# Patient Record
Sex: FEMALE | Race: WHITE | NOT HISPANIC OR LATINO | Employment: UNEMPLOYED | URBAN - METROPOLITAN AREA
[De-identification: names, ages, dates, MRNs, and addresses within clinical notes are randomized per-mention and may not be internally consistent; named-entity substitution may affect disease eponyms.]

---

## 2021-05-26 ENCOUNTER — RECORDS - HEALTHEAST (OUTPATIENT)
Dept: ADMINISTRATIVE | Facility: CLINIC | Age: 61
End: 2021-05-26

## 2023-03-23 ENCOUNTER — HOSPITAL ENCOUNTER (EMERGENCY)
Facility: HOSPITAL | Age: 63
Discharge: HOME | End: 2023-03-24
Attending: EMERGENCY MEDICINE | Admitting: EMERGENCY MEDICINE
Payer: COMMERCIAL

## 2023-03-23 DIAGNOSIS — R20.0 NUMBNESS OF RIGHT FOOT: ICD-10-CM

## 2023-03-23 PROCEDURE — 99282 EMERGENCY DEPT VISIT SF MDM: CPT

## 2023-03-23 ASSESSMENT — ACTIVITIES OF DAILY LIVING (ADL): ADLS_ACUITY_SCORE: 35

## 2023-03-23 ASSESSMENT — ENCOUNTER SYMPTOMS
NUMBNESS: 1
BACK PAIN: 1

## 2023-03-24 VITALS
TEMPERATURE: 97.3 F | SYSTOLIC BLOOD PRESSURE: 131 MMHG | RESPIRATION RATE: 20 BRPM | OXYGEN SATURATION: 92 % | HEART RATE: 78 BPM | DIASTOLIC BLOOD PRESSURE: 80 MMHG | WEIGHT: 185 LBS | BODY MASS INDEX: 29.03 KG/M2 | HEIGHT: 67 IN

## 2023-03-24 NOTE — DISCHARGE INSTRUCTIONS
Please contact the spine clinic tomorrow to discuss her symptoms and to arrange a follow-up appointment.    Return back to ED sooner for any worsening numbness/tingling/weakness in your right foot or leg, loss of bowel bladder control, worsening back or leg pain, or any other new or concerning symptoms.

## 2023-03-24 NOTE — ED PROVIDER NOTES
EMERGENCY DEPARTMENT ENCOUNTER      NAME: Michell Orozco  AGE: 62 year old female  YOB: 1960  MRN: [id number]  EVALUATION DATE & TIME: 3/23/2023 10:14 PM    PCP: No primary care provider on file.    ED PROVIDER: Rohith Rodriguez DO      Chief Complaint   Patient presents with     Numbness         FINAL IMPRESSION:  1. Numbness of right foot          ED COURSE & MEDICAL DECISION MAKIN-year-old female with a history of low back pain and right leg sciatica who recently received a steroid injection to the lumbar region a few days ago presented to the ED for evaluation of sudden onset right foot numbness.  The patient states that she has experienced right leg and foot paresthesias in the past with her history of sciatica.  However, she states that she has never experienced a complete numbness in her foot.  She denies any associated weakness within the right foot or leg.  She also denied any loss of bowel or bladder control or saddle anesthesia.  The patient also denied any numbness/tingling/weakness in her remaining face or extremities.  She also denies any associated headaches.  The patient also noted that her numbness was resolving when she arrived to the ED.  On exam the patient was noted to have mild subjective sensory discrepancies to light touch involving only the toes and her right foot when compared to the left.  No subjective deficits were noted with the remainder of the right foot when compared to the left.  Strength in the bilateral lower extremities was 5/5.     The patient was informed that the right foot numbness is likely somehow related to her right-sided sciatica.  She was informed that the numbness was not likely due to a CVA.  Discussion was had with the patient  about the possibility of imaging both the head and/or lumbar spine.  However, both the patient and  felt comfortable returning home without any additional imaging because of her numbness had essentially resolved.   The patient was then instructed to contact her spine clinic tomorrow for reevaluation.  She was instructed to return back to ED sooner for any worsening numbness/tingling/weakness in the right leg or foot, remaining extremities, face, or for any loss of bowel or bladder control, headaches, or any other new or concerning symptoms.    Pertinent Labs & Imaging studies reviewed. (See chart for details)  10:14 PM I met with the patient to gather history and to perform my initial exam. We discussed plans for the ED course, including diagnostic testing and treatment.   12:26 AM We discussed plans for discharge.       At the conclusion of the encounter I discussed the results of all of the tests and the disposition. The questions were answered. The patient or family acknowledged understanding and was agreeable with the care plan.        PPE worn: n95 mask, goggles    Medical Decision Making    History:    Supplemental history from: Documented in chart, if applicable    External Record(s) reviewed: Documented in chart, if applicable.    Work Up:    Chart documentation includes differential considered and any EKGs or imaging independently interpreted by provider.    In additional to work up documented, I considered the following work up: Documented in chart, if applicable.    External consultation:    Discussion of management with another provider: Documented in chart, if applicable    Complicating factors:    Care impacted by chronic illness: Hypertension    Care affected by social determinants of health: N/A    Disposition considerations: Discharge. No recommendations on prescription strength medication(s). N/A.       MEDICATIONS GIVEN IN THE EMERGENCY:  Medications - No data to display    NEW PRESCRIPTIONS STARTED AT TODAY'S ER VISIT  There are no discharge medications for this patient.         =================================================================    HPI    Patient information was obtained from: Patient     Use  "of : N/A         Michell Orozco is a 62 year old female with a pertinent history of GERD, HTN, slom camcer, who presents to this ED via walk in accompanied by  for evaluation of numbness.     About 1 month ago, the patient bended over, got up went home and started to have back pain. She notes the back pain became severely painful therefore she went to the chiropractor to readjust her back with no relief. The patient got a MRI which resulted with right impingement. The patient got steroid injection to her back on Monday 03/20 (~4 days ago).  Since the back pain started she endorses right lower extremity tingling. She describe the tingling as \"pins and needles\" sensation. Tonight, the patient felt numbness to right lower extremity that is a new sensation. She could still move her toes but does not feel the sensation of moving them. She states having right leg numbness since 8 PM (~2 hours). Denies any other complaints or concerns at the moment.       REVIEW OF SYSTEMS   Review of Systems   Musculoskeletal: Positive for back pain.   Neurological: Positive for numbness (right lower extremities).   All other systems reviewed and are negative.       PAST MEDICAL HISTORY:  History reviewed. No pertinent past medical history.    PAST SURGICAL HISTORY:  History reviewed. No pertinent surgical history.        CURRENT MEDICATIONS:    No current outpatient medications on file.      ALLERGIES:  No Known Allergies    FAMILY HISTORY:  No family history on file.    SOCIAL HISTORY:   Social History     Socioeconomic History     Marital status: Single     Spouse name: None     Number of children: None     Years of education: None     Highest education level: None       VITALS:  /80   Pulse 78   Temp 97.3  F (36.3  C) (Oral)   Resp 20   Ht 1.702 m (5' 7\")   Wt 83.9 kg (185 lb)   SpO2 92%   BMI 28.98 kg/m      PHYSICAL EXAM    General presentation: Alert, Vital signs reviewed. NAD  HENT: ENT inspection is " normal. Oropharynx is moist and clear.   Eye: Pupils are equal and reactive to light. EOMI  Neck: The neck is supple, with full ROM, with no evidence of meningismus.  Pulmonary: Currently in no acute respiratory distress. Normal, non labored respirations, the lung sounds are normal with good equal air movement. Clear to auscultation bilaterally.   Circulatory: Regular rate and rhythm. Peripheral pulses are strong and equal. No murmurs, rubs, or gallops.   Abdominal: The abdomen is soft. Nontender. No rigidity, guarding, or rebound. Bowel sounds normal.   Neurologic: Alert, oriented to person, place, and time. Strength is 5/5 with hip flexion, knee flexion and extension, plantarflexion/dorsiflexion, and first toe flexion/extension.  Mild sensory discrepancies were noted to palpation involving the toes of the right foot when compared to the left.  No other sensory deficits were noted within the right leg or remaining extremities.  Cranial nerves II through XII are intact.  Musculoskeletal: No extremity tenderness. Full range of motion in all extremities. No extremity edema.   Skin: Skin color is normal. No rash. Warm. Dry to touch.       ITracy , am serving as a scribe to document services personally performed by Rohith Rodriguez DO based on my observation and the provider's statements to me. I, Rohith Rodriguez, attest that Tracy Mccabe is acting in a scribe capacity, has observed my performance of the services and has documented them in accordance with my direction.    Rohith Rodriguez DO  Emergency Medicine  [facility] EMERGENCY DEPARTMENT  [address] [place] [id number]  [phone]        Rohith Rodriguez DO  03/24/23 0154

## 2023-03-24 NOTE — ED TRIAGE NOTES
Patient arrives to triage from home with chief complaint of right foot numbness which started around 2000 tonight.  Patient reports having a steroid injection on 03/20/23 at L4-L5.  Now since arriving to triage patient reports sensation is starting to return.  She can feel the carpet on right foot in triage.  Pulses are present.  Non-NIH is negative for writer.  Patient reports she was having difficulty before arrival placing weight on the right foot.  Alert and oriented x4.

## 2023-03-24 NOTE — ED NOTES
"Pt reports that when she arrived her foot was tingling \"like it had fallen asleep, but really really extreme\" \"scared me\". Says that at home she couldn't feel anything at all. She says that it has improved a little since she has been here in the ER, but still not normal. She is able to feel textures and sensations.     Received steroid injection in lower back on Monday.  "

## 2023-03-27 ENCOUNTER — APPOINTMENT (OUTPATIENT)
Dept: ULTRASOUND IMAGING | Facility: HOSPITAL | Age: 63
End: 2023-03-27
Attending: EMERGENCY MEDICINE
Payer: COMMERCIAL

## 2023-03-27 ENCOUNTER — APPOINTMENT (OUTPATIENT)
Dept: INTERVENTIONAL RADIOLOGY/VASCULAR | Facility: HOSPITAL | Age: 63
End: 2023-03-27
Attending: RADIOLOGY
Payer: COMMERCIAL

## 2023-03-27 ENCOUNTER — HOSPITAL ENCOUNTER (INPATIENT)
Facility: HOSPITAL | Age: 63
LOS: 4 days | Discharge: HOME | End: 2023-03-31
Attending: EMERGENCY MEDICINE | Admitting: INTERNAL MEDICINE
Payer: COMMERCIAL

## 2023-03-27 DIAGNOSIS — I82.4Y1: ICD-10-CM

## 2023-03-27 DIAGNOSIS — I26.99 SUBACUTE MASSIVE PULMONARY EMBOLISM (H): ICD-10-CM

## 2023-03-27 DIAGNOSIS — I48.92 PAROXYSMAL ATRIAL FLUTTER (H): Primary | ICD-10-CM

## 2023-03-27 DIAGNOSIS — R00.0 SINUS TACHYCARDIA: ICD-10-CM

## 2023-03-27 DIAGNOSIS — R06.02 SOB (SHORTNESS OF BREATH): ICD-10-CM

## 2023-03-27 DIAGNOSIS — R79.89 ELEVATED TROPONIN: ICD-10-CM

## 2023-03-27 DIAGNOSIS — R79.89 ELEVATED BRAIN NATRIURETIC PEPTIDE (BNP) LEVEL: ICD-10-CM

## 2023-03-27 PROBLEM — G43.009 MIGRAINE WITHOUT AURA AND WITHOUT STATUS MIGRAINOSUS, NOT INTRACTABLE: Status: ACTIVE | Noted: 2019-04-26

## 2023-03-27 LAB
ACT BLD: 186 SECONDS (ref 74–150)
ACT BLD: 237 SECONDS (ref 74–150)
APTT PPP: 185 SECONDS (ref 22–38)
ATRIAL RATE - MUSE: 120 BPM
BASOPHILS # BLD AUTO: 0 10E3/UL (ref 0–0.2)
BASOPHILS NFR BLD AUTO: 0 %
DIASTOLIC BLOOD PRESSURE - MUSE: 81 MMHG
EOSINOPHIL # BLD AUTO: 0.1 10E3/UL (ref 0–0.7)
EOSINOPHIL NFR BLD AUTO: 1 %
ERYTHROCYTE [DISTWIDTH] IN BLOOD BY AUTOMATED COUNT: 13.1 % (ref 10–15)
HCT VFR BLD AUTO: 41.2 % (ref 35–47)
HGB BLD-MCNC: 13.9 G/DL (ref 11.7–15.7)
IMM GRANULOCYTES # BLD: 0.1 10E3/UL
IMM GRANULOCYTES NFR BLD: 1 %
INR PPP: 1.42 (ref 0.85–1.15)
INTERPRETATION ECG - MUSE: NORMAL
LYMPHOCYTES # BLD AUTO: 2.3 10E3/UL (ref 0.8–5.3)
LYMPHOCYTES NFR BLD AUTO: 23 %
MCH RBC QN AUTO: 29 PG (ref 26.5–33)
MCHC RBC AUTO-ENTMCNC: 33.7 G/DL (ref 31.5–36.5)
MCV RBC AUTO: 86 FL (ref 78–100)
MONOCYTES # BLD AUTO: 1.2 10E3/UL (ref 0–1.3)
MONOCYTES NFR BLD AUTO: 12 %
NEUTROPHILS # BLD AUTO: 6.7 10E3/UL (ref 1.6–8.3)
NEUTROPHILS NFR BLD AUTO: 63 %
NRBC # BLD AUTO: 0 10E3/UL
NRBC BLD AUTO-RTO: 0 /100
NT-PROBNP SERPL-MCNC: 7484 PG/ML (ref 0–900)
P AXIS - MUSE: 51 DEGREES
PLATELET # BLD AUTO: 189 10E3/UL (ref 150–450)
PR INTERVAL - MUSE: 194 MS
QRS DURATION - MUSE: 78 MS
QT - MUSE: 282 MS
QTC - MUSE: 398 MS
R AXIS - MUSE: -22 DEGREES
RBC # BLD AUTO: 4.79 10E6/UL (ref 3.8–5.2)
SYSTOLIC BLOOD PRESSURE - MUSE: 144 MMHG
T AXIS - MUSE: 37 DEGREES
TROPONIN T SERPL HS-MCNC: 47 NG/L
UFH PPP CHRO-ACNC: 0.88 IU/ML
UFH PPP CHRO-ACNC: >1.1 IU/ML
VENTRICULAR RATE- MUSE: 120 BPM
WBC # BLD AUTO: 10.3 10E3/UL (ref 4–11)

## 2023-03-27 PROCEDURE — 96365 THER/PROPH/DIAG IV INF INIT: CPT

## 2023-03-27 PROCEDURE — 93005 ELECTROCARDIOGRAM TRACING: CPT | Performed by: EMERGENCY MEDICINE

## 2023-03-27 PROCEDURE — 85347 COAGULATION TIME ACTIVATED: CPT

## 2023-03-27 PROCEDURE — 99222 1ST HOSP IP/OBS MODERATE 55: CPT | Performed by: INTERNAL MEDICINE

## 2023-03-27 PROCEDURE — 250N000011 HC RX IP 250 OP 636: Performed by: EMERGENCY MEDICINE

## 2023-03-27 PROCEDURE — 02CQ3ZZ EXTIRPATION OF MATTER FROM RIGHT PULMONARY ARTERY, PERCUTANEOUS APPROACH: ICD-10-PCS | Performed by: RADIOLOGY

## 2023-03-27 PROCEDURE — 96376 TX/PRO/DX INJ SAME DRUG ADON: CPT

## 2023-03-27 PROCEDURE — 76937 US GUIDE VASCULAR ACCESS: CPT

## 2023-03-27 PROCEDURE — 99291 CRITICAL CARE FIRST HOUR: CPT | Mod: 25

## 2023-03-27 PROCEDURE — 85004 AUTOMATED DIFF WBC COUNT: CPT | Performed by: EMERGENCY MEDICINE

## 2023-03-27 PROCEDURE — 250N000011 HC RX IP 250 OP 636: Performed by: RADIOLOGY

## 2023-03-27 PROCEDURE — C1769 GUIDE WIRE: HCPCS

## 2023-03-27 PROCEDURE — 83880 ASSAY OF NATRIURETIC PEPTIDE: CPT | Performed by: EMERGENCY MEDICINE

## 2023-03-27 PROCEDURE — 36415 COLL VENOUS BLD VENIPUNCTURE: CPT | Performed by: EMERGENCY MEDICINE

## 2023-03-27 PROCEDURE — 272N000500 HC NEEDLE CR2

## 2023-03-27 PROCEDURE — 85730 THROMBOPLASTIN TIME PARTIAL: CPT | Performed by: EMERGENCY MEDICINE

## 2023-03-27 PROCEDURE — 85610 PROTHROMBIN TIME: CPT | Performed by: EMERGENCY MEDICINE

## 2023-03-27 PROCEDURE — 75743 ARTERY X-RAYS LUNGS: CPT

## 2023-03-27 PROCEDURE — 02CR3ZZ EXTIRPATION OF MATTER FROM LEFT PULMONARY ARTERY, PERCUTANEOUS APPROACH: ICD-10-PCS | Performed by: RADIOLOGY

## 2023-03-27 PROCEDURE — 255N000002 HC RX 255 OP 636: Performed by: INTERNAL MEDICINE

## 2023-03-27 PROCEDURE — 272N000566 HC SHEATH CR3

## 2023-03-27 PROCEDURE — 94660 CPAP INITIATION&MGMT: CPT

## 2023-03-27 PROCEDURE — 96366 THER/PROPH/DIAG IV INF ADDON: CPT

## 2023-03-27 PROCEDURE — 84484 ASSAY OF TROPONIN QUANT: CPT | Performed by: EMERGENCY MEDICINE

## 2023-03-27 PROCEDURE — 93005 ELECTROCARDIOGRAM TRACING: CPT | Performed by: STUDENT IN AN ORGANIZED HEALTH CARE EDUCATION/TRAINING PROGRAM

## 2023-03-27 PROCEDURE — 37184 PRIM ART M-THRMBC 1ST VSL: CPT | Mod: 50

## 2023-03-27 PROCEDURE — 36014 PLACE CATHETER IN ARTERY: CPT | Mod: 50

## 2023-03-27 PROCEDURE — 85520 HEPARIN ASSAY: CPT | Performed by: EMERGENCY MEDICINE

## 2023-03-27 PROCEDURE — C1757 CATH, THROMBECTOMY/EMBOLECT: HCPCS

## 2023-03-27 PROCEDURE — 999N000157 HC STATISTIC RCP TIME EA 10 MIN

## 2023-03-27 PROCEDURE — 93970 EXTREMITY STUDY: CPT

## 2023-03-27 PROCEDURE — 96375 TX/PRO/DX INJ NEW DRUG ADDON: CPT

## 2023-03-27 PROCEDURE — 200N000001 HC R&B ICU

## 2023-03-27 RX ORDER — NICOTINE POLACRILEX 4 MG
15-30 LOZENGE BUCCAL
Status: DISCONTINUED | OUTPATIENT
Start: 2023-03-27 | End: 2023-03-31 | Stop reason: HOSPADM

## 2023-03-27 RX ORDER — NALOXONE HYDROCHLORIDE 0.4 MG/ML
0.4 INJECTION, SOLUTION INTRAMUSCULAR; INTRAVENOUS; SUBCUTANEOUS
Status: DISCONTINUED | OUTPATIENT
Start: 2023-03-27 | End: 2023-03-28

## 2023-03-27 RX ORDER — POLYETHYLENE GLYCOL 3350 17 G/17G
17 POWDER, FOR SOLUTION ORAL DAILY PRN
Status: DISCONTINUED | OUTPATIENT
Start: 2023-03-27 | End: 2023-03-31 | Stop reason: HOSPADM

## 2023-03-27 RX ORDER — PANTOPRAZOLE SODIUM 40 MG/1
40 TABLET, DELAYED RELEASE ORAL
Status: DISCONTINUED | OUTPATIENT
Start: 2023-03-28 | End: 2023-03-31 | Stop reason: HOSPADM

## 2023-03-27 RX ORDER — ACETAMINOPHEN 325 MG/1
650 TABLET ORAL EVERY 4 HOURS PRN
Status: DISCONTINUED | OUTPATIENT
Start: 2023-03-27 | End: 2023-03-31 | Stop reason: HOSPADM

## 2023-03-27 RX ORDER — FLUMAZENIL 0.1 MG/ML
0.2 INJECTION, SOLUTION INTRAVENOUS
Status: DISCONTINUED | OUTPATIENT
Start: 2023-03-27 | End: 2023-03-30

## 2023-03-27 RX ORDER — HEPARIN SODIUM 200 [USP'U]/100ML
1 INJECTION, SOLUTION INTRAVENOUS CONTINUOUS PRN
Status: DISCONTINUED | OUTPATIENT
Start: 2023-03-27 | End: 2023-03-28

## 2023-03-27 RX ORDER — AMOXICILLIN 250 MG
1 CAPSULE ORAL 2 TIMES DAILY PRN
Status: DISCONTINUED | OUTPATIENT
Start: 2023-03-27 | End: 2023-03-31 | Stop reason: HOSPADM

## 2023-03-27 RX ORDER — SUMATRIPTAN 20 MG/1
1 SPRAY NASAL
COMMUNITY
Start: 2022-03-02

## 2023-03-27 RX ORDER — AMOXICILLIN 250 MG
2 CAPSULE ORAL 2 TIMES DAILY PRN
Status: DISCONTINUED | OUTPATIENT
Start: 2023-03-27 | End: 2023-03-31 | Stop reason: HOSPADM

## 2023-03-27 RX ORDER — TOPIRAMATE 25 MG/1
1 TABLET, FILM COATED ORAL AT BEDTIME
COMMUNITY
Start: 2023-03-13

## 2023-03-27 RX ORDER — HEPARIN SODIUM 10000 [USP'U]/100ML
0-5000 INJECTION, SOLUTION INTRAVENOUS CONTINUOUS
Status: DISCONTINUED | OUTPATIENT
Start: 2023-03-27 | End: 2023-03-29

## 2023-03-27 RX ORDER — FENTANYL CITRATE 50 UG/ML
25-50 INJECTION, SOLUTION INTRAMUSCULAR; INTRAVENOUS EVERY 5 MIN PRN
Status: DISCONTINUED | OUTPATIENT
Start: 2023-03-27 | End: 2023-03-28

## 2023-03-27 RX ORDER — IODIXANOL 320 MG/ML
300 INJECTION, SOLUTION INTRAVASCULAR ONCE
Status: COMPLETED | OUTPATIENT
Start: 2023-03-27 | End: 2023-03-27

## 2023-03-27 RX ORDER — NALOXONE HYDROCHLORIDE 0.4 MG/ML
0.2 INJECTION, SOLUTION INTRAMUSCULAR; INTRAVENOUS; SUBCUTANEOUS
Status: DISCONTINUED | OUTPATIENT
Start: 2023-03-27 | End: 2023-03-28

## 2023-03-27 RX ORDER — DEXTROSE MONOHYDRATE 25 G/50ML
25-50 INJECTION, SOLUTION INTRAVENOUS
Status: DISCONTINUED | OUTPATIENT
Start: 2023-03-27 | End: 2023-03-31 | Stop reason: HOSPADM

## 2023-03-27 RX ORDER — HYDROMORPHONE HYDROCHLORIDE 1 MG/ML
0.5 INJECTION, SOLUTION INTRAMUSCULAR; INTRAVENOUS; SUBCUTANEOUS ONCE
Status: COMPLETED | OUTPATIENT
Start: 2023-03-27 | End: 2023-03-27

## 2023-03-27 RX ADMIN — IODIXANOL 70 ML: 320 INJECTION, SOLUTION INTRAVASCULAR at 20:19

## 2023-03-27 RX ADMIN — Medication 3000 UNITS: at 19:10

## 2023-03-27 RX ADMIN — HYDROMORPHONE HYDROCHLORIDE 0.5 MG: 1 INJECTION, SOLUTION INTRAMUSCULAR; INTRAVENOUS; SUBCUTANEOUS at 17:20

## 2023-03-27 RX ADMIN — HEPARIN SODIUM AND DEXTROSE 1500 UNITS/HR: 10000; 5 INJECTION INTRAVENOUS at 17:24

## 2023-03-27 RX ADMIN — FENTANYL CITRATE 50 MCG: 50 INJECTION, SOLUTION INTRAMUSCULAR; INTRAVENOUS at 18:52

## 2023-03-27 RX ADMIN — FENTANYL CITRATE 25 MCG: 50 INJECTION, SOLUTION INTRAMUSCULAR; INTRAVENOUS at 19:15

## 2023-03-27 ASSESSMENT — ACTIVITIES OF DAILY LIVING (ADL)
ADLS_ACUITY_SCORE: 35

## 2023-03-27 NOTE — ED TRIAGE NOTES
Patient arrives from urgency room via EMS.   Patient c/o chest pain under left breast and shortness of breath for the past two days that has grown significantly worse today.   Dx at urgency room with saddle PE's and transferred here.     Loading dose of 5000 units heparin given at urgency room and 0.5mg IV dilaudid given en route to ED for pain.

## 2023-03-27 NOTE — PRE-PROCEDURE
GENERAL PRE-PROCEDURE:   Procedure:  Pulmonary thrombectomy  Date/Time:  3/27/2023 6:51 PM    Written consent obtained?: Yes    Risks and benefits: Risks, benefits and alternatives were discussed    Consent given by:  Patient  Patient states understanding of procedure being performed: Yes    Patient's understanding of procedure matches consent: Yes    Procedure consent matches procedure scheduled: Yes    Expected level of sedation:  Moderate  Appropriately NPO:  Yes  ASA Class:  3  Mallampati  :  Grade 2- soft palate, base of uvula, tonsillar pillars, and portion of posterior pharyngeal wall visible  Lungs:  Lungs clear with good breath sounds bilaterally  Heart:  Normal heart sounds and rate  History & Physical reviewed:  History and physical reviewed and no updates needed  Statement of review:  I have reviewed the lab findings, diagnostic data, medications, and the plan for sedation

## 2023-03-27 NOTE — ED PROVIDER NOTES
EMERGENCY DEPARTMENT ENCOUNTER      NAME: Michell Orozco  AGE: 62 year old female  YOB: 1960  MRN: [id number]  EVALUATION DATE & TIME: 3/27/2023  4:36 PM    PCP: Aruna Packer    ED PROVIDER: Tania Celaya MD    Chief Complaint   Patient presents with     Shortness of Breath         FINAL IMPRESSION:  1. Subacute massive pulmonary embolism (H)    2. Sinus tachycardia    3. SOB (shortness of breath)    4. Acute deep vein thrombosis of proximal leg, right (H)    5. Elevated troponin    6. Elevated brain natriuretic peptide (BNP) level          ED COURSE & MEDICAL DECISION MAKING:    Pertinent Labs & Imaging studies reviewed. (See chart for details)  62 year old female with history of GERD who presents to the Emergency Department for evaluation of shortness of breath progressive over the course of the last week and significantly more progressive over the last 2 days with left inframammary pleuritic pain.  Seen at urgency room prior to arrival with evidence of submassive PE on imaging.  Labs reviewed they are notable for elevated BNP and troponin.  She was given a heparin bolus and sent here.    Symptoms are consistent with submassive PE.  Patient has a oxygen requirement here, is tachypneic and tachycardic.  Certainly patient is a candidate for potential thrombectomy, interventional thrombolysis.  Case discussed with body IR, pulmonology who agree with above, and patient was ultimately taken urgently from the ER for thrombectomy.    While here in the ER an EKG was obtained showing sinus tachycardia.  Patient was given Dilaudid for pain, heparin drip.  CBC, BMP, BNP, troponin, coags notable for elevated PTT but patient had already received heparin prior to arrival.  Troponin 47.  BNP 7484.  Duplex ultrasounds of the bilateral lower extremities were obtained.  This shows a significant occlusive DVT in the right lower extremity.  Echocardiogram ordered, and patient admitted to ICU pending her  thrombectomy.      ED Course as of 03/27/23 2248   Mon Mar 27, 2023   1658 Resp(!): 32   1659 Pulse: 117   1707 Spoke w Dr. Ferrera, body IR   1718 Spoke w Dr. Escalera, pulm   1727 Spoke Dr. Ferrera, enroute for thrombectomy   1745 Updated patient and  at bedside.   1749 N-Terminal Pro BNP Inpatient(!): 7,484   1749 Troponin T, High Sensitivity(!): 47   1758 PTT(!!): 185  Already had heparin bolus @ UR   1759 Spoke w body rad - +massive occlusive DVT R femoral/pop       Medical Decision Making    History:    Supplemental history from: Family Member/Significant Other and Other: Emergency room provider    External Record(s) reviewed: Outpatient Record: Emergency room records from today    Work Up:    Chart documentation includes differential considered and any EKGs or imaging independently interpreted by provider, where specified.    In additional to work up documented, I considered the following work up: Documented in chart, if applicable.    External consultation:    Discussion of management with another provider: Documented in chart, if applicable    Complicating factors:    Care impacted by chronic illness: N/A    Care affected by social determinants of health: Access to Medical Care    Disposition considerations: Admit.        At the conclusion of the encounter I discussed the results of all of the tests and the disposition. The questions were answered. The patient or family acknowledged understanding and was agreeable with the care plan.    CONSULTS:  Interventional radiology  Pulmonology  Hospitalist medicine    CRITICAL CARE:  Critical Care  Performed by: Tania Celaya MD  Authorized by: Tania Celaya MD     Total critical care time: 52 minutes  Criticalcare time was exclusive of separately billable procedures and treating other patients.  Critical care was necessary to treat or prevent imminent or life-threatening deterioration of the following conditions: Cardiopulmonary decompensation, death,  disability  Critical care was time spent personally by me on the following activities: development of treatment plan with patient or surrogate, discussions with consultants, examination of patient, evaluation of patient's response totreatment, obtaining history from patient or surrogate, ordering and performing treatments and interventions, ordering and review of laboratory studies, ordering and review of radiographic studies and re-evaluation ofpatient's condition, this excludes any separately billable procedures.      MEDICATIONS GIVEN IN THE EMERGENCY:  Medications   heparin infusion 25,000 units in D5W 250 mL ANTICOAGULANT (1,500 Units/hr Intravenous Rate/Dose Verify 3/27/23 2045)   heparin (PRESSURE BAG) 2 Units/mL in 0.9% NaCl (500 mL) (has no administration in time range)   lidocaine 1 % 1-30 mL (has no administration in time range)   midazolam (VERSED) injection 0.5-2 mg (has no administration in time range)   flumazenil (ROMAZICON) injection 0.2 mg (has no administration in time range)   fentaNYL (PF) (SUBLIMAZE) injection 25-50 mcg (25 mcg Intravenous $Given 3/27/23 1915)   naloxone (NARCAN) injection 0.2 mg (has no administration in time range)     Or   naloxone (NARCAN) injection 0.4 mg (has no administration in time range)     Or   naloxone (NARCAN) injection 0.2 mg (has no administration in time range)     Or   naloxone (NARCAN) injection 0.4 mg (has no administration in time range)   Medication Instruction: Stop tPA (alteplase) infusion IF any signs of major systemic bleeding, any neurological deterioration, development of severe headache, sudden severe elevation of BP, or new nausea or vomiting AND notify provider (has no administration in time range)   acetaminophen (TYLENOL) tablet 650 mg (has no administration in time range)   oxyCODONE IR (ROXICODONE) half-tab 2.5-5 mg (has no administration in time range)   glucose gel 15-30 g (has no administration in time range)     Or   dextrose 50 %  injection 25-50 mL (has no administration in time range)     Or   glucagon injection 1 mg (has no administration in time range)   Patient is already receiving anticoagulation with heparin, enoxaparin (LOVENOX), warfarin (COUMADIN)  or other anticoagulant medication (has no administration in time range)   senna-docusate (SENOKOT-S/PERICOLACE) 8.6-50 MG per tablet 1 tablet (has no administration in time range)     Or   senna-docusate (SENOKOT-S/PERICOLACE) 8.6-50 MG per tablet 2 tablet (has no administration in time range)   polyethylene glycol (MIRALAX) Packet 17 g (has no administration in time range)   pantoprazole (PROTONIX) EC tablet 40 mg (has no administration in time range)   HYDROmorphone (PF) (DILAUDID) injection 0.5 mg (0.5 mg Intravenous $Given 3/27/23 1720)   heparin ANTICOAGULANT bolus dose from infusion pump 3,000 Units (3,000 Units Intravenous $Given 3/27/23 1910)   iodixanol (VISIPAQUE 320) injection 300 mL (70 mLs Arterial $Given 3/27/23 2019)       NEW PRESCRIPTIONS STARTED AT TODAY'S ER VISIT  Current Discharge Medication List             =================================================================    HPI    Patient information was obtained from: Patient     Use of Intrepreter: N/A        Michell Orozco is a 62 year old female with no pertinent medical history on file who presents with shortness of breath.    Per chart review, patient is sent from , EKG showed sinus tachycardia with S1 QT 3T pattern. Her D-dimer, troponin and BNP were all elevated abnormally. Her CT scan showed a saddle pulmonary embolism with a large burden of acute bilateral pulmonary emboli with marked right heart strain but no evidence of pulmonary infarction    Per patient, she has been having shortness of breath for 1 week and yesterday developed left sided chest pain. Patient says that over the past couple days she did notice left calf pain but says that she thought it was because she hasn't been active. Patient denies  fever, cough, and history of cardiac prroblems or blood clots.     REVIEW OF SYSTEMS  Constitutional:  Denies fever, chills, weight loss or weakness  Respiratory: No wheeze or cough. Positive for shortness of breath   Cardiovascular:  No palpitations. Positive for chest pain.   GI:  Denies abdominal pain, nausea, vomiting, diarrhea  Musculoskeletal:  Denies swelling or loss of function. Positive for left calf pain.       PAST MEDICAL HISTORY:  History reviewed. No pertinent past medical history.    PAST SURGICAL HISTORY:  Past Surgical History:   Procedure Laterality Date     IR PULMONARY ANGIOGRAM BILATERAL  3/27/2023       CURRENT MEDICATIONS:    Prior to Admission Medications   Prescriptions Last Dose Informant Patient Reported? Taking?   SUMAtriptan (IMITREX) 20 MG/ACT nasal spray   Yes Yes   Sig: Spray 1 spray in nostril every 2 hours as needed USE 1 SPRAY(S) EVERY 2 HOURS AS NEEDED. MAX 40MG/24 HOURS   esomeprazole (NEXIUM) 20 MG DR capsule 3/27/2023  Yes Yes   Sig: Take 20 mg by mouth every morning (before breakfast) Take 30-60 minutes before eating.   topiramate (TOPAMAX) 25 MG tablet   Yes No   Sig: Take 1 tablet by mouth At Bedtime      Facility-Administered Medications: None       ALLERGIES:  No Known Allergies    FAMILY HISTORY:  No family history on file.    SOCIAL HISTORY:        VITALS:  Patient Vitals for the past 24 hrs:   BP Temp Temp src Pulse Resp SpO2 Height Weight   03/27/23 2230 -- -- -- 89 (!) 31 98 % -- --   03/27/23 2215 104/70 -- -- 88 27 100 % -- --   03/27/23 2200 103/71 -- -- 88 22 98 % -- --   03/27/23 2145 104/70 -- -- 90 30 97 % -- --   03/27/23 2130 102/69 -- -- 89 (!) 34 98 % -- --   03/27/23 2115 110/71 -- -- 88 30 95 % -- --   03/27/23 2100 108/74 -- -- 88 (!) 34 94 % -- --   03/27/23 2053 -- -- -- 83 -- 96 % -- --   03/27/23 2052 (!) 155/83 -- -- -- -- (!) 89 % -- --   03/27/23 2051 -- -- -- -- -- 97 % -- --   03/27/23 2045 105/71 99.3  F (37.4  C) [facility] 89 (!) 38 96 % -- --    03/27/23 2025 116/77 -- -- 93 (!) 31 97 % -- --   03/27/23 2024 -- -- -- 91 (!) 33 95 % -- --   03/27/23 2023 -- -- -- 92 (!) 38 96 % -- --   03/27/23 2022 -- -- -- 86 -- 97 % -- --   03/27/23 2021 -- -- -- 84 -- 96 % -- --   03/27/23 2020 113/76 -- -- 91 18 95 % -- --   03/27/23 2019 -- -- -- 93 15 96 % -- --   03/27/23 2018 -- -- -- 92 19 96 % -- --   03/27/23 2017 -- -- -- 93 14 96 % -- --   03/27/23 2016 -- -- -- 93 18 96 % -- --   03/27/23 2015 114/73 -- -- 93 23 96 % -- --   03/27/23 2014 -- -- -- 94 23 96 % -- --   03/27/23 2013 -- -- -- 96 14 96 % -- --   03/27/23 2012 -- -- -- 94 14 94 % -- --   03/27/23 2011 -- -- -- 93 10 97 % -- --   03/27/23 2010 117/75 -- -- 94 25 97 % -- --   03/27/23 2009 -- -- -- 94 28 97 % -- --   03/27/23 2008 -- -- -- 95 28 97 % -- --   03/27/23 2007 -- -- -- 95 28 97 % -- --   03/27/23 2006 -- -- -- 95 27 97 % -- --   03/27/23 2005 118/76 -- -- 96 (!) 32 97 % -- --   03/27/23 2004 -- -- -- 96 27 97 % -- --   03/27/23 2003 -- -- -- 96 27 97 % -- --   03/27/23 2002 -- -- -- 96 (!) 31 97 % -- --   03/27/23 2001 -- -- -- 97 (!) 31 98 % -- --   03/27/23 2000 120/76 -- -- 96 30 98 % -- --   03/27/23 1959 -- -- -- 96 29 97 % -- --   03/27/23 1958 -- -- -- 97 30 98 % -- --   03/27/23 1957 -- -- -- 98 30 99 % -- --   03/27/23 1956 -- -- -- 98 29 99 % -- --   03/27/23 1955 118/69 -- -- 98 30 98 % -- --   03/27/23 1954 -- -- -- 99 16 97 % -- --   03/27/23 1953 -- -- -- 98 26 97 % -- --   03/27/23 1952 -- -- -- 101 30 97 % -- --   03/27/23 1951 -- -- -- 100 29 98 % -- --   03/27/23 1950 135/80 -- -- 100 (!) 31 99 % -- --   03/27/23 1949 -- -- -- 101 29 99 % -- --   03/27/23 1948 -- -- -- 101 28 99 % -- --   03/27/23 1947 -- -- -- 102 29 100 % -- --   03/27/23 1946 -- -- -- 101 29 100 % -- --   03/27/23 1945 129/73 -- -- 104 30 98 % -- --   03/27/23 1944 -- -- -- 105 29 99 % -- --   03/27/23 1943 -- -- -- 107 (!) 31 99 % -- --   03/27/23 1942 -- -- -- 105 30 95 % -- --   03/27/23 1941 (!)  142/83 -- -- 106 (!) 32 (!) 88 % -- --   03/27/23 1940 -- -- -- 110 (!) 33 (!) 87 % -- --   03/27/23 1939 -- -- -- 107 29 (!) 89 % -- --   03/27/23 1938 -- -- -- 106 25 (!) 89 % -- --   03/27/23 1937 -- -- -- 107 (!) 33 (!) 88 % -- --   03/27/23 1936 -- -- -- 112 (!) 31 (!) 84 % -- --   03/27/23 1935 (!) 145/87 -- -- 112 30 (!) 80 % -- --   03/27/23 1934 -- -- -- 109 (!) 31 (!) 74 % -- --   03/27/23 1933 -- -- -- 108 (!) 35 (!) 74 % -- --   03/27/23 1932 -- -- -- 107 30 (!) 71 % -- --   03/27/23 1931 -- -- -- 106 27 (!) 71 % -- --   03/27/23 1930 (!) 150/95 -- -- 106 (!) 36 (!) 70 % -- --   03/27/23 1929 -- -- -- 105 (!) 36 (!) 72 % -- --   03/27/23 1928 -- -- -- 104 29 (!) 74 % -- --   03/27/23 1927 -- -- -- 104 (!) 33 (!) 74 % -- --   03/27/23 1926 -- -- -- 104 (!) 32 (!) 74 % -- --   03/27/23 1925 (!) 149/84 -- -- 104 (!) 34 (!) 68 % -- --   03/27/23 1924 -- -- -- 105 24 (!) 72 % -- --   03/27/23 1923 -- -- -- 105 21 (!) 73 % -- --   03/27/23 1922 -- -- -- 106 25 (!) 75 % -- --   03/27/23 1921 -- -- -- 103 24 (!) 77 % -- --   03/27/23 1920 (!) 148/89 -- -- 104 18 (!) 78 % -- --   03/27/23 1919 -- -- -- 104 18 (!) 79 % -- --   03/27/23 1918 -- -- -- 105 30 (!) 80 % -- --   03/27/23 1917 -- -- -- 113 18 (!) 84 % -- --   03/27/23 1916 -- -- -- 102 15 (!) 88 % -- --   03/27/23 1915 (!) 153/84 -- -- (!) 121 20 (!) 88 % -- --   03/27/23 1914 -- -- -- (!) 134 10 90 % -- --   03/27/23 1913 -- -- -- (!) 136 12 90 % -- --   03/27/23 1912 -- -- -- (!) 132 13 90 % -- --   03/27/23 1911 (!) 144/106 -- -- (!) 133 25 90 % -- --   03/27/23 1910 -- -- -- (!) 131 (!) 36 (!) 89 % -- --   03/27/23 1909 -- -- -- (!) 135 23 (!) 88 % -- --   03/27/23 1908 -- -- -- (!) 129 29 (!) 88 % -- --   03/27/23 1907 -- -- -- (!) 134 27 (!) 87 % -- --   03/27/23 1906 -- -- -- (!) 139 28 90 % -- --   03/27/23 1905 134/86 -- -- (!) 142 17 91 % -- --   03/27/23 1904 -- -- -- (!) 142 12 91 % -- --   03/27/23 1903 -- -- -- 99 18 91 % -- --   03/27/23  "1902 -- -- -- 100 14 91 % -- --   03/27/23 1901 -- -- -- 100 15 90 % -- --   03/27/23 1900 127/84 -- -- 101 15 91 % -- --   03/27/23 1859 -- -- -- 102 20 91 % -- --   03/27/23 1858 -- -- -- 100 28 91 % -- --   03/27/23 1857 -- -- -- 99 30 91 % -- --   03/27/23 1856 -- -- -- 104 (!) 31 91 % -- --   03/27/23 1855 120/69 -- -- 101 26 91 % -- --   03/27/23 1854 -- -- -- 101 10 91 % -- --   03/27/23 1853 -- -- -- 101 21 90 % -- --   03/27/23 1852 -- -- -- 102 20 91 % -- --   03/27/23 1851 -- -- -- 104 20 91 % -- --   03/27/23 1850 121/63 -- -- 105 14 92 % -- --   03/27/23 1849 -- -- -- 105 13 92 % -- --   03/27/23 1848 -- -- -- 104 11 92 % -- --   03/27/23 1847 -- -- -- 103 13 92 % -- --   03/27/23 1846 -- -- -- 103 (!) 9 91 % -- --   03/27/23 1845 114/63 -- -- 104 (!) 6 92 % -- --   03/27/23 1844 -- -- -- 104 (!) 7 92 % -- --   03/27/23 1739 -- 98.2  F (36.8  C) Oral -- -- 95 % -- --   03/27/23 1641 (!) 142/84 -- -- 117 (!) 32 94 % 1.702 m (5' 7\") 83.9 kg (185 lb)       PHYSICAL EXAM    General Appearance: Uncomfortable appearing female, tachypneic, tachycardic in mild respiratory distress  Head:  Normocephalic  Eyes:  conjunctiva/corneas clear  ENT:  membranes are moist without pallor  Neck:  Supple  Chest: Tenderness palpation left chest wall  Cardio:  Tachycardic, no murmur/gallop/rub, 2+ pulses symmetric in all extremities  Pulm: Tachypneic in mild respiratory distress.  Lungs clear  Abdomen:  Soft, non-tender  Extremities: No appreciable edema localized to either lower extremity.  No palpable cords  Skin:  Skin warm, dry, no rashes  Neuro:  Alert and oriented ×3     RADIOLOGY/LABS:  Reviewed all pertinent imaging. Please see official radiology report. All pertinent labs reviewed and interpreted.    Results for orders placed or performed during the hospital encounter of 03/27/23   US Lower Extremity Venous Duplex Bilateral    Impression    IMPRESSION:  1.  Extensive acute DVT right lower extremity.  2.  Left lower " extremity negative for DVT.  3.  Results discussed with Dr. Tania Nichoslon on 03/27/2023 at 6:00 PM.   IR Pulmonary Angiogram Bilateral    Impression    IMPRESSION:    1.  Large volume pulmonary thrombectomy including large saddle emboli and large central occlusive right pulmonary emboli. Marked improved perfusion to the entire right lung with marked improved oxygenation. Small volume clot burden on the left,   unchanged.  2.  Suspect patient has an element of chronic underlying pulmonary hypertension.     Result Value Ref Range    Troponin T, High Sensitivity 47 (H) <=14 ng/L   Nt probnp inpatient   Result Value Ref Range    N terminal Pro BNP Inpatient 7,484 (H) 0 - 900 pg/mL   Result Value Ref Range    Anti Xa Unfractionated Heparin 0.88 For Reference Range, See Comment IU/mL   Result Value Ref Range    INR 1.42 (H) 0.85 - 1.15   Result Value Ref Range    aPTT 185 (HH) 22 - 38 Seconds   CBC with platelets and differential   Result Value Ref Range    WBC Count 10.3 4.0 - 11.0 10e3/uL    RBC Count 4.79 3.80 - 5.20 10e6/uL    Hemoglobin 13.9 11.7 - 15.7 g/dL    Hematocrit 41.2 35.0 - 47.0 %    MCV 86 78 - 100 fL    MCH 29.0 26.5 - 33.0 pg    MCHC 33.7 31.5 - 36.5 g/dL    RDW 13.1 10.0 - 15.0 %    Platelet Count 189 150 - 450 10e3/uL    % Neutrophils 63 %    % Lymphocytes 23 %    % Monocytes 12 %    % Eosinophils 1 %    % Basophils 0 %    % Immature Granulocytes 1 %    NRBCs per 100 WBC 0 <1 /100    Absolute Neutrophils 6.7 1.6 - 8.3 10e3/uL    Absolute Lymphocytes 2.3 0.8 - 5.3 10e3/uL    Absolute Monocytes 1.2 0.0 - 1.3 10e3/uL    Absolute Eosinophils 0.1 0.0 - 0.7 10e3/uL    Absolute Basophils 0.0 0.0 - 0.2 10e3/uL    Absolute Immature Granulocytes 0.1 <=0.4 10e3/uL    Absolute NRBCs 0.0 10e3/uL   Activated clotting time celite, POCT   Result Value Ref Range    Activated Clotting Time (Celite) POCT 186 (H) 74 - 150 seconds   Activated clotting time celite, POCT   Result Value Ref Range    Activated Clotting Time  (Celite) POCT 237 (H) 74 - 150 seconds   ECG 12-LEAD WITH MUSE (LHE)   Result Value Ref Range    Systolic Blood Pressure 144 mmHg    Diastolic Blood Pressure 81 mmHg    Ventricular Rate 120 BPM    Atrial Rate 120 BPM    AR Interval 194 ms    QRS Duration 78 ms     ms    QTc 398 ms    P Axis 51 degrees    R AXIS -22 degrees    T Axis 37 degrees    Interpretation ECG       Sinus tachycardia  Minimal voltage criteria for LVH, may be normal variant  Inferior infarct , age undetermined  Abnormal ECG  No previous ECGs available  Confirmed by SEE ED PROVIDER NOTE FOR, ECG INTERPRETATION ([id number]),  TAY LIAO ([id number]) on 3/27/2023 5:16:27 PM         EKG:  Performed at: 27-Mar-2023 17:13    Impression: Sinus tachycardia. Minimal voltage criteria for LVH, may be normal variant. Inferior infarct, age undetermined.     Rate: 12  Rhythm: Sinus tachycardia   Axis: -22  AR Interval: 194  QRS Interval: 78  QTc Interval: 398  ST Changes: None  Comparison: No previous  I have independently reviewed and interpreted the EKG(s) documented above.    The creation of this record is based on the scribe s observations of the work being performed by Tania Celaya MD and the provider s statements to them. It was created on her behalf by Adis Del Cid, a trained medical scribe. This document has been checked and approved by the attending provider.    Tania Celaya MD  Emergency Medicine  [facility] EMERGENCY DEPARTMENT  [facility] [facility] [id number] [phone]  Dept: [phone]       Tania Celaya MD  03/27/23 6819

## 2023-03-27 NOTE — ED NOTES
Expected Patient Referral to ED  4:02 PM    Referring Clinic/Provider:  PA at [facility] Urgency Room    Reason for referral/Clinical facts:  Referral for saddle pulmonary embolism with CT evidence of right heart strain.  She is tachycardic 100s to 130s, maintaining blood pressure 130/100 at this time, conscious and alert..  Presented with progressive dyspnea.  CT demonstrates pulmonary embolism.    Recommendations provided:  Please start heparin if able and we can see her here in the emergency department.      Gurvinder Russell MD  [facility] EMERGENCY DEPARTMENT  [address] [place] [id number]  [phone]       Gurvinder Russell MD  03/27/23 7333

## 2023-03-27 NOTE — SEDATION DOCUMENTATION
Pt reports chest pain 8/10 and slight SOB. Noted rr 24 and sats 91% on oxymask at 20L. Prior to beginning procedure.

## 2023-03-27 NOTE — H&P
"[facility]    History and Physical - Hospitalist Service       Date of Admission:  3/27/2023    Assessment & Plan      Michell Orozco is a 62 year old female admitted on 3/27/2023 for acute pulmonary embolism, lower extremity DVT and respiratory failure:    Acute bilateral pulmonary embolism with cor pulmonale, acute hypoxemic respiratory failure, acute DVT right lower extremity:  Presents with dyspnea on exertion for one week.  Outside facility CT scan shows  a saddle pulmonary embolism with a large burden of acute bilateral pulmonary emboli with marked right heart strain but no evidence of pulmonary infarction.  US shows extensive acute DVT right lower extremity.  The PE appears to be nonprovoked.  She reports that she has been more sedentary due to lower back pain for the past few weeks.  No prior history of DVT.  - Start heparin drip  - IR planning emergent thrombectomy  - Echocardiogram  - Transition to oral anticoagulants when appropriate  - Patient needed up to 10 LPM nasal cannula oxygen in ER.  Continue and wean as tolerated.    GERD: on PPI       Diet:  Regular diet  DVT Prophylaxis: Heparin   [place] Catheter: Not present  Lines: None     Cardiac Monitoring: None  Code Status:  Full code    Clinically Significant Risk Factors Present on Admission               # Coagulation Defect: INR = 1.42 (Ref range: 0.85 - 1.15) and/or PTT = 185 Seconds (Ref range: 22 - 38 Seconds), will monitor for bleeding         # Overweight: Estimated body mass index is 28.98 kg/m  as calculated from the following:    Height as of this encounter: 1.702 m (5' 7\").    Weight as of this encounter: 83.9 kg (185 lb).           Disposition Plan      Expected Discharge Date: 03/29/2023                  Molly Nino MD  Hospitalist Service  [facility]  Securely message with [employer] (more info)  Text page via [facility] Paging/Directory "     ______________________________________________________________________    Chief Complaint   Shortness of breath    History is obtained from the patient    History of Present Illness   Michell Orozco is a 62 year old female without significant medical history sent from urgent care to ER for massive pulmonary embolism.  Patient reports that 1 week ago, she developed shortness of breath, which worsens on exertion.  For the past 2 days, her shortness of breath gets even worse.  She also developed left-sided chest pain and left calf pain.  She went to urgent care for evaluation today.  CT scan showed a saddle pulmonary embolism with a large burden of acute bilateral pulmonary emboli with marked right heart strain but no evidence of pulmonary infarction.  Patient was sent to ER for further care. In ER, US shows extensive acute DVT right lower extremity. Patient reports no fever, cough, hemoptysis and abdominal pain. She reports no recent long distance travel. She recently had lower back pain and had steroid injection. Because of her back pain, she mainly stays at home and is not as active as before. No personal or family history of DVT.      Past Medical History    No past medical history on file.    Past Surgical History   No past surgical history on file.    Prior to Admission Medications   None        Review of Systems    The 10 point Review of Systems is negative other than noted in the HPI or here.      Physical Exam   Vital Signs: Temp: 98.2  F (36.8  C) Temp src: Oral BP: (!) 142/84 Pulse: 117   Resp: (!) 32 SpO2: 95 % O2 Device: Oxymask Oxygen Delivery: 10 LPM  Weight: 185 lbs 0 oz    General appearance: not in acute distress  HEENT: PERRL, EOMI  Lungs: Clear breath sounds in bilateral lung fields  Cardiovascular: Tachycardia, regular rhythm, normal S1-S2  Abdomen: Soft, non tender, no distension  Musculoskeletal: No joint swelling  Skin: No rash and no edema  Neurology: AAO ×3.  Cranial nerves II - XII normal.   Normal muscle strength in all four extremities.    Medical Decision Making       65 MINUTES SPENT BY ME on the date of service doing chart review, history, exam, documentation & further activities per the note.      Data     I have personally reviewed the following data over the past 24 hrs:    10.3  \   13.9   / 189     N/A N/A N/A /  N/A   N/A N/A N/A \       Trop: 47 (H) BNP: 7,484 (H)       INR:  1.42 (H) PTT:  185 (HH)   D-dimer:  N/A Fibrinogen:  N/A       Imaging results reviewed over the past 24 hrs:   No results found for this or any previous visit (from the past 24 hour(s)).

## 2023-03-27 NOTE — PHARMACY-ADMISSION MEDICATION HISTORY
Pharmacy Note - Admission Medication History    Pertinent Provider Information: None     ______________________________________________________________________    Prior To Admission (PTA) med list completed and updated in EMR.       PTA Med List   Medication Sig Last Dose     esomeprazole (NEXIUM) 20 MG DR capsule Take 20 mg by mouth every morning (before breakfast) Take 30-60 minutes before eating. 3/27/2023     SUMAtriptan (IMITREX) 20 MG/ACT nasal spray Spray 1 spray in nostril every 2 hours as needed USE 1 SPRAY(S) EVERY 2 HOURS AS NEEDED. MAX 40MG/24 HOURS        Information source(s): Patient and CareEverywhere/SureScripts  Method of interview communication: in-person    Summary of Changes to PTA Med List  New: All  Discontinued: None  Changed: None    Patient was asked about OTC/herbal products specifically.  PTA med list reflects this.    In the past week, patient estimated taking medication this percent of the time:  greater than 90%.    Medication Affordability:  Not including over the counter (OTC) medications, was there a time in the past 12 months when you did not take your medications as prescribed because of cost?: No    Allergies were reviewed, assessed, and updated with the patient.      Patient does not use any multi-dose medications prior to admission.    The information provided in this note is only as accurate as the sources available at the time of the update(s).    Thank you for the opportunity to participate in the care of this patient.    Nikia Turner, [facility]  3/27/2023 6:16 PM

## 2023-03-28 ENCOUNTER — APPOINTMENT (OUTPATIENT)
Dept: CARDIOLOGY | Facility: HOSPITAL | Age: 63
End: 2023-03-28
Attending: EMERGENCY MEDICINE
Payer: COMMERCIAL

## 2023-03-28 LAB
ANION GAP SERPL CALCULATED.3IONS-SCNC: 16 MMOL/L (ref 7–15)
BASOPHILS # BLD AUTO: 0 10E3/UL (ref 0–0.2)
BASOPHILS NFR BLD AUTO: 0 %
BUN SERPL-MCNC: 17.4 MG/DL (ref 8–23)
CALCIUM SERPL-MCNC: 8.4 MG/DL (ref 8.8–10.2)
CHLORIDE SERPL-SCNC: 102 MMOL/L (ref 98–107)
CREAT SERPL-MCNC: 0.87 MG/DL (ref 0.51–0.95)
DEPRECATED HCO3 PLAS-SCNC: 20 MMOL/L (ref 22–29)
EOSINOPHIL # BLD AUTO: 0.1 10E3/UL (ref 0–0.7)
EOSINOPHIL NFR BLD AUTO: 1 %
ERYTHROCYTE [DISTWIDTH] IN BLOOD BY AUTOMATED COUNT: 13.2 % (ref 10–15)
GFR SERPL CREATININE-BSD FRML MDRD: 75 ML/MIN/1.73M2
GLUCOSE SERPL-MCNC: 96 MG/DL (ref 70–99)
HCT VFR BLD AUTO: 42.9 % (ref 35–47)
HGB BLD-MCNC: 13.5 G/DL (ref 11.7–15.7)
HOLD SPECIMEN: NORMAL
HOLD SPECIMEN: NORMAL
IMM GRANULOCYTES # BLD: 0 10E3/UL
IMM GRANULOCYTES NFR BLD: 0 %
LVEF ECHO: NORMAL
LYMPHOCYTES # BLD AUTO: 2.3 10E3/UL (ref 0.8–5.3)
LYMPHOCYTES NFR BLD AUTO: 25 %
MCH RBC QN AUTO: 28.4 PG (ref 26.5–33)
MCHC RBC AUTO-ENTMCNC: 31.5 G/DL (ref 31.5–36.5)
MCV RBC AUTO: 90 FL (ref 78–100)
MONOCYTES # BLD AUTO: 1 10E3/UL (ref 0–1.3)
MONOCYTES NFR BLD AUTO: 11 %
NEUTROPHILS # BLD AUTO: 5.7 10E3/UL (ref 1.6–8.3)
NEUTROPHILS NFR BLD AUTO: 63 %
NRBC # BLD AUTO: 0 10E3/UL
NRBC BLD AUTO-RTO: 0 /100
PLATELET # BLD AUTO: 184 10E3/UL (ref 150–450)
POTASSIUM SERPL-SCNC: 4 MMOL/L (ref 3.4–5.3)
RBC # BLD AUTO: 4.75 10E6/UL (ref 3.8–5.2)
SODIUM SERPL-SCNC: 138 MMOL/L (ref 136–145)
UFH PPP CHRO-ACNC: 0.22 IU/ML
UFH PPP CHRO-ACNC: 0.32 IU/ML
UFH PPP CHRO-ACNC: 0.78 IU/ML
WBC # BLD AUTO: 9.1 10E3/UL (ref 4–11)

## 2023-03-28 PROCEDURE — 85004 AUTOMATED DIFF WBC COUNT: CPT | Performed by: STUDENT IN AN ORGANIZED HEALTH CARE EDUCATION/TRAINING PROGRAM

## 2023-03-28 PROCEDURE — 36415 COLL VENOUS BLD VENIPUNCTURE: CPT | Performed by: SURGERY

## 2023-03-28 PROCEDURE — 36415 COLL VENOUS BLD VENIPUNCTURE: CPT | Performed by: STUDENT IN AN ORGANIZED HEALTH CARE EDUCATION/TRAINING PROGRAM

## 2023-03-28 PROCEDURE — 85520 HEPARIN ASSAY: CPT | Performed by: STUDENT IN AN ORGANIZED HEALTH CARE EDUCATION/TRAINING PROGRAM

## 2023-03-28 PROCEDURE — 99223 1ST HOSP IP/OBS HIGH 75: CPT | Performed by: NURSE PRACTITIONER

## 2023-03-28 PROCEDURE — 255N000002 HC RX 255 OP 636: Performed by: STUDENT IN AN ORGANIZED HEALTH CARE EDUCATION/TRAINING PROGRAM

## 2023-03-28 PROCEDURE — 85520 HEPARIN ASSAY: CPT | Performed by: SURGERY

## 2023-03-28 PROCEDURE — 200N000001 HC R&B ICU

## 2023-03-28 PROCEDURE — 99232 SBSQ HOSP IP/OBS MODERATE 35: CPT | Performed by: STUDENT IN AN ORGANIZED HEALTH CARE EDUCATION/TRAINING PROGRAM

## 2023-03-28 PROCEDURE — 80048 BASIC METABOLIC PNL TOTAL CA: CPT | Performed by: STUDENT IN AN ORGANIZED HEALTH CARE EDUCATION/TRAINING PROGRAM

## 2023-03-28 PROCEDURE — 93306 TTE W/DOPPLER COMPLETE: CPT | Mod: 26 | Performed by: INTERNAL MEDICINE

## 2023-03-28 PROCEDURE — 250N000013 HC RX MED GY IP 250 OP 250 PS 637: Performed by: INTERNAL MEDICINE

## 2023-03-28 PROCEDURE — 999N000287 HC ICU ADULT ROUNDING, EACH 10 MINS

## 2023-03-28 PROCEDURE — 250N000011 HC RX IP 250 OP 636: Performed by: EMERGENCY MEDICINE

## 2023-03-28 RX ORDER — NALOXONE HYDROCHLORIDE 0.4 MG/ML
0.2 INJECTION, SOLUTION INTRAMUSCULAR; INTRAVENOUS; SUBCUTANEOUS
Status: DISCONTINUED | OUTPATIENT
Start: 2023-03-28 | End: 2023-03-31 | Stop reason: HOSPADM

## 2023-03-28 RX ORDER — NALOXONE HYDROCHLORIDE 0.4 MG/ML
0.4 INJECTION, SOLUTION INTRAMUSCULAR; INTRAVENOUS; SUBCUTANEOUS
Status: DISCONTINUED | OUTPATIENT
Start: 2023-03-28 | End: 2023-03-31 | Stop reason: HOSPADM

## 2023-03-28 RX ADMIN — PERFLUTREN 2 ML: 6.52 INJECTION, SUSPENSION INTRAVENOUS at 08:18

## 2023-03-28 RX ADMIN — Medication 5 MG: at 10:07

## 2023-03-28 RX ADMIN — HEPARIN SODIUM AND DEXTROSE 1100 UNITS/HR: 10000; 5 INJECTION INTRAVENOUS at 12:01

## 2023-03-28 RX ADMIN — Medication 5 MG: at 16:10

## 2023-03-28 RX ADMIN — PANTOPRAZOLE SODIUM 40 MG: 40 TABLET, DELAYED RELEASE ORAL at 06:35

## 2023-03-28 RX ADMIN — Medication 5 MG: at 23:51

## 2023-03-28 RX ADMIN — ACETAMINOPHEN 650 MG: 325 TABLET, FILM COATED ORAL at 23:55

## 2023-03-28 ASSESSMENT — ACTIVITIES OF DAILY LIVING (ADL)
WALKING_OR_CLIMBING_STAIRS_DIFFICULTY: NO
CONCENTRATING,_REMEMBERING_OR_MAKING_DECISIONS_DIFFICULTY: NO
DIFFICULTY_EATING/SWALLOWING: NO
DRESSING/BATHING_DIFFICULTY: NO
ADLS_ACUITY_SCORE: 24
ADLS_ACUITY_SCORE: 24
TOILETING_ISSUES: NO
FALL_HISTORY_WITHIN_LAST_SIX_MONTHS: NO
DEPENDENT_IADLS:: INDEPENDENT
ADLS_ACUITY_SCORE: 35
ADLS_ACUITY_SCORE: 24
ADLS_ACUITY_SCORE: 24
DOING_ERRANDS_INDEPENDENTLY_DIFFICULTY: NO
WEAR_GLASSES_OR_BLIND: NO
ADLS_ACUITY_SCORE: 24
ADLS_ACUITY_SCORE: 24
ADLS_ACUITY_SCORE: 41
DIFFICULTY_COMMUNICATING: NO
ADLS_ACUITY_SCORE: 41
ADLS_ACUITY_SCORE: 24
ADLS_ACUITY_SCORE: 41
CHANGE_IN_FUNCTIONAL_STATUS_SINCE_ONSET_OF_CURRENT_ILLNESS/INJURY: NO
ADLS_ACUITY_SCORE: 24

## 2023-03-28 NOTE — PROGRESS NOTES
"  RT called to IR for low oxygen saturations. Patient placed on Bipap settings Ipap 12, EPAP 6, RR 14, FiO2 100%O2. FiO2 was weaned down to 45% after thrombectomy. Bipap was than placed on standby and patient placed on 5 lpm oxymask, pt's SpO2 remained above 95%.     /75   Pulse 94   Temp 98.2  F (36.8  C) (Oral)   Resp 25   Ht 1.702 m (5' 7\")   Wt 83.9 kg (185 lb)   SpO2 97%   BMI 28.98 kg/m        Luisa Miles, RT    "

## 2023-03-28 NOTE — CONSULTS
[facility] Consultation by Pulmonary Medicine    Michell Orozco MRN# [id number]   Age: 62 year old YOB: 1960     Date of Admission:  3/27/2023    Reason for consult: Pulmonary Emboli                           Assessment and Plan:   Assessment:   Acute bilateral pulmonary embolism with cor pulmonale - echo showed moderately decreased RV systolic function and flattened septum c/w RV pressure overload.     Right lower extremity DVT - extensive acute DVT extending from the distal right femoral vein through the popliteal vein and into the posterior tibial and peroneal veins      Plan:   - Patient now s/p pulmonary angiogram with PE thrombectomy.   - Agree with IV Heparin infusion. Would start DOAC at any time.   - Needs cancer screening. Strong family history of breast cancer maternal grandmother, mother, and 1 sister; 2 other sisters with urethra cancer. She does not remember when her last mammogram was. Outside chart indicates (+) HPV test on 5/21/2018.   - Will need follow up Echo in 3-months to eval for resolution of elevated RV pressures.   - Minimum 3-months anticoagulation.     Our service will sign off. Please let us know if we can be of further assistance.     Mona Mendez, CNP  [facility] Pulmonary/Critical Care              Chief Complaint:   Dyspnea     HPI: 62 year old woman with history of migraine and elevated blood pressure. She presented to ER  From urgent care with massive pulmonary embolism. She has a one week history of dyspnea and 1-month history of low back pain. Her back pain was bad enough that her activity was significantly affected and she was unable to move around as much as she usually does. She sought evaluation at Urgent Care when she started having chest pain with deep breaths and right sided leg pain.   CT scan of the chest from outside facility showed saddle PE with large clot burden with marked right heart strain. US done here showed extensive  acute DVT extending from the distal right femoral vein through the popliteal vein and into the posterior tibial and peroneal veins. She was taken to IR for pulmonary angiogram with PE thrombectomy. Returned to ICU on heparin gtt for further care.                  Past Medical History:   I have reviewed this patient's past medical history  History reviewed. No pertinent past medical history.          Past Surgical History:     I have reviewed this patient's past surgical history  Past Surgical History:   Procedure Laterality Date     IR PULMONARY ANGIOGRAM BILATERAL  3/27/2023             Social History:     I have reviewed this patient's social history  Social History     Tobacco Use     Smoking status: Former     Types: Cigarettes     Smokeless tobacco: Never   Substance Use Topics     Alcohol use: Not on file             Family History:   I have reviewed this patient's family history  No family history on file.  Family history reviewed and old charts reviewed              Allergies:   All allergies reviewed and addressed  No Known Allergies          Medications:     Current Facility-Administered Medications   Medication     acetaminophen (TYLENOL) tablet 650 mg     glucose gel 15-30 g    Or     dextrose 50 % injection 25-50 mL    Or     glucagon injection 1 mg     flumazenil (ROMAZICON) injection 0.2 mg     heparin infusion 25,000 units in D5W 250 mL ANTICOAGULANT     Medication Instruction: Stop tPA (alteplase) infusion IF any signs of major systemic bleeding, any neurological deterioration, development of severe headache, sudden severe elevation of BP, or new nausea or vomiting AND notify provider     naloxone (NARCAN) injection 0.2 mg    Or     naloxone (NARCAN) injection 0.4 mg    Or     naloxone (NARCAN) injection 0.2 mg    Or     naloxone (NARCAN) injection 0.4 mg     oxyCODONE IR (ROXICODONE) half-tab 2.5-5 mg     pantoprazole (PROTONIX) EC tablet 40 mg     Patient is already receiving anticoagulation with  heparin, enoxaparin (LOVENOX), warfarin (COUMADIN)  or other anticoagulant medication     polyethylene glycol (MIRALAX) Packet 17 g     senna-docusate (SENOKOT-S/PERICOLACE) 8.6-50 MG per tablet 1 tablet    Or     senna-docusate (SENOKOT-S/PERICOLACE) 8.6-50 MG per tablet 2 tablet             Review of Systems:   Patient complains of ongoing lower back pain and left chest pain with deep breaths. Pain in her right leg is improved. No nausea. Becomes short of breath with talking.           Physical Exam:   Vitals were reviewed  Temp: 98.4  F (36.9  C) Temp src: Oral BP: 120/69 Pulse: 99   Resp: (!) 49 SpO2: (!) 86 % O2 Device: Nasal cannula Oxygen Delivery: 2 LPM  HEENMT: AT/NC   Neuro: grossly nonfocal  CV: RRR S1S2 no murmur.   Pulm: no distress at rest but becomes dyspneic with speaking. Lungs are clear and without wheeze.   GI: Soft ntnd  Integ: Visible skin intact.           Data:   All laboratory data reviewed  Lab Results   Component Value Date    WBC 9.1 03/28/2023    HGB 13.5 03/28/2023    HCT 42.9 03/28/2023     03/28/2023     03/28/2023    POTASSIUM 4.0 03/28/2023    CHLORIDE 102 03/28/2023    CO2 20 (L) 03/28/2023    BUN 17.4 03/28/2023    CR 0.87 03/28/2023    GLC 96 03/28/2023    NTBNPI 7,484 (H) 03/27/2023    INR 1.42 (H) 03/27/2023     All cardiac studies reviewed by me.  All imaging studies reviewed by me.       PALAK Iraheta CNP

## 2023-03-28 NOTE — PLAN OF CARE
[facility] - ICU    RN Progress Note:            Pertinent Assessments:      Please refer to flowsheet rows for full assessment     VSS, Alert and oriented x 4, can be anxious at times, stayed flat on bed til 0215. O2 from 4L down to 2L/NC. Patient is limiting her movements due to PE related chest pain when taking deep breaths, talking too much, and moving her left arm. Offered pain med several times, but patient continues to refuse, goes back to sleep when left comfortable in bed. Patient is able to move all extremities. Right groin puncture site post thrombectomy dressing clean, dry, intact, no hematoma, redness or bruising around site.         Mobility Level:     2    Barriers to Progression          Key Events - This Shift:     Heparin gtt was paused for 1 hr then resumed at 1200 unit/hr rate after 2300 anti xa result came back.   Next anti xa at 0630, will address per protocol.             Barriers to Discharge / Downgrade:     Needing heparin gtt for PE and DVT.

## 2023-03-28 NOTE — SEDATION DOCUMENTATION
1910 Pt began having some chest pain. Hr tach 120's-140's with ectopy from MD trying to advance cath. Noted sats 90-91% on 25L. 1915 Fentanyl 25mcg given to help pt. Began noticing sats start to drop into the 80's. Dr. Ferrera Informed. Call placed to swat nurse for help. Sats cont to drop into the 70's. 1923 Swat Nurse present and called for ICU RT to come. Call placed for 2nd IR nurse to come help.  RT informed staff was Bringing down bipap for pt. Other VS remained stable. Noted pt's color around lips and nose turning bluer and pt C/O increased chest pain. Sats dipped down into the high 60's and then back into the 70's.  Dr. Ferrera wanted TPA for PEs at bedside incase needing to give since pt not improving. Call placed to pharmacy to get and order placed. At 1935 pt placed on Bipap. 12/6 rate 14 and 100% FIO2. ICU Intensivist wanted pt to be intubated and anesthia at bedside but pt's sats began to climb after IR doc was able to remove some large clots. 1946 sats 100% on bipap settings. Intubation placed on hold for now and 2nd IR nurse here to help. Noted pt's color returning to normal and stated was doing better. RT Was able to titrate FIO2 down to 45%. Dr. Ferrera was able to remove more clot and a 2nd ACT was taken. Results in the 200's and MD informed. Pt's VSS stable and pt reports CP has gotten better. Procedure ended at 2010.  Dr. Ferrera wanted to see if we could place pt back on an Oxymask. Rt at bedside and was able to place pt on 5L via Oxymask with Sats 95%. Pt reports chest pain 7/10 at 2035 and that breathing is a little better. 2045. Pt brought up to ICU room 353 with SO at bedside. Report given to Primary Nurse and site checked with Nurse. No questions at that time.

## 2023-03-28 NOTE — PROGRESS NOTES
"  Interventional Radiology - Progress Note  Inpatient - [facility]  03/28/2023     S:  Patient lying in bed. Still continues to complain of some CP and shortness of breath. Some improvement post procedure. On 2L 02. Sats stable. EMR. Stable overnight.      O:  /71   Pulse 83   Temp 98.4  F (36.9  C) (Oral)   Resp (!) 31   Ht 1.702 m (5' 7\")   Wt 78.2 kg (172 lb 8 oz)   SpO2 99%   BMI 27.02 kg/m    General:  Stable.  In no acute distress.    Neuro:  A&O x 3. Moves all extremities equally.  Resp:  Lungs clear to auscultation bilaterally.  Cardio:  S1S2 and reg, without murmur, clicks or rubs  Abdomen:  Soft, non-distended, non-tender, positive bowel sounds.   Vascular: +2/4 bilateral dorsalis pedis pulses, +2/4 bilateral posterior tibial pulses.    Skin:  Normal. Groin site clean intact. No hematoma.  MSK:  No gross motor weakness. Sensation intact. Proprioception intact.     IMAGING:  [place]  LOCATION: [facility]  DATE: 3/27/2023     PROCEDURE:   1.  LARGE BORE VASCULAR CLOSURE - - PRECLOSE  2.  PRIMARY ARTERIAL MECHANICAL THROMBECTOMY - - RIGHT PULMONARY ARTERY  3.  PRIMARY ARTERIAL MECHANICAL THROMBECTOMY - - LEFT PULMONARY ARTERY  4.  BILATERAL PULMONARY ANGIOGRAM  5.  SELECTIVE CATHETERIZATION SEGMENTAL PULMONARY ARTERY - - BILATERAL  6.  IVC VENOGRAM     INTERVENTIONAL RADIOLOGIST: Adis Ferrera MD     INDICATION: Submassive PE. Saddle PE with large volume right PE and right heart strain. Hypoxemia.     Medications: Fentanyl 75 mcg IV and heparin 3000 units IV  Air Kerma: 497 mGy  FLUOROSCOPIC TIME: 21.9 minutes   CONTRAST: 70 mL Visipaque 320     COMPLICATIONS: No immediate complications.     PROCEDURE:   The patient was placed supine. Both groins were prepped and draped in usual sterile fashion followed by local anesthesia with 1% Xylocaine. Ultrasound revealed a patent and compressible right common femoral vein. An ultrasound image " of the right common   femoral vein was obtained and placed in the patient's permanent medical record. Using real-time ultrasound for needle placement and a micropuncture system, a right common femoral vein puncture was performed. A ileal caval venogram was performed. A 6   Russian sheath was placed. 2 Proglide closure devices were then deployed at the right common femoral vein access in preclose fashion. The 6 Russian sheath was then replaced. A 6 Russian angled pigtail catheter was advanced through the right heart into the   main pulmonary artery. Baseline pulmonary pressures were obtained. The catheters advanced selectively into the left main pulmonary artery. Selective left main pulmonary artery angiography was performed. Over an exchange 1 cm floppy tip Amplatz superstiff   wire positioned in the left lower lobe pulmonary artery, the 24 Russian Inari Intri sheath was placed via right common femoral vein access. The coaxial 24 Russian Inari Clotriever Inari Flowtriever device was advanced into left main pulmonary artery.   Arterial mechanical thrombectomy was performed the left main pulmonary artery. Follow-up left pulmonary angiogram was performed. Wire and catheter were then used to access the right lower lobe pulmonary artery branch. The 24 Russian Inari Clotriever   device was advanced into the right lower lobe pulmonary artery branch. Pulmonary artery mechanical thrombectomy was performed of the right lower lobe and distal right main pulmonary artery. Completion right pulmonary artery angiography was performed.   Completion pulmonary pressures were obtained. All access was removed from the right femoral vein with hemostasis obtained with the previously placed Proglide closure devices. A venous pressure dressing was applied.     Note: Early in the process of thrombectomy, prior to large volume thrombectomy, patient developed significant hypoxemia with O2 sats of 79% on a 15 L oxygen by mask. Respiratory therapy was  consult and present and placed the patient on BiPAP with marked   improved oxygenation. Following thrombectomy, BiPAP was removed and patient had O2 saturations of 96% on 5 L by oxymask.     FINDINGS:   Baseline pulmonary pressures: 44/14, mean 27 mm Hg     Post-thrombectomy pulmonary artery pressures: 38/20, mean 26 mm Hg     IVC venogram: The right common iliac vein and IVC are widely patent.     Left pulmonary angiogram: Low clot burden. Segmental and subsegmental branch emboli in the left upper and lower lobe branch vasculature. Terminal perfusion of the lung is relatively normal.     Right pulmonary angiogram: Large saddle pulmonary emboli spanning the right pulmonary artery with large volume occlusive distal right main pulmonary artery emboli. Baseline, very poor perfusion of the lung parenchyma with partial flow to the right upper   lobe.     Primary arterial mechanical thrombectomy right pulmonary artery: Large volume saddle emboli and occlusive distal right main pulmonary emboli aspirated. Large clot burden aspirated. Completion angiogram shows marked improved perfusion to the entire right   lung. No residual saddle or central emboli.     Primary arterial mechanical thrombectomy left pulmonary artery: No appreciable clot aspirated. Most of the clot is not accessible with large bore thrombectomy device.                                                                      IMPRESSION:    1.  Large volume pulmonary thrombectomy including large saddle emboli and large central occlusive right pulmonary emboli. Marked improved perfusion to the entire right lung with marked improved oxygenation. Small volume clot burden on the left,   unchanged.  2.  Suspect patient has an element of chronic underlying pulmonary hypertension.    LABS:  Recent Labs   Lab 03/28/23  0723 03/27/23  1756 03/27/23  1708   WBC 9.1 10.3  --    HGB 13.5 13.9  --     189  --    INR  --   --  1.42*   CR 0.87  --   --          A:   62  yo female with past medical history of migraines sent to ED from urgent care due to abnormal EKG and Chest CT. CT revealed saddle pulmonary embolism. Thrombectomy completed on 3/27/23. S/p day 1 PE thrombectomy with continued chest pain and nasal canula 2L O2. Stable post interevntion    P:    -Cares per med team  - Transition to oral anticoagulation per ICU recommendations.  - Continue to trend Hgb and Plts.  - Post venogram/mechanical thrombectomy cares discussed with patient. Questions answered. Venogram/mechanicalthrombectomy D/C instructions entered into navigator.  - The above discussedwith patient. Questions answered  - OK to discharge from IR prescriptive once patient has successfully transitioned to oral anticoagulation.   - Please contact IR with questions or concerns.        #[phone]  Total time spent on the date of the encounter: 20 minutes.    PALAK GAN CNP  Interventional Radiology  [phone]

## 2023-03-28 NOTE — PROCEDURES
INTERVENTIONAL RADIOLOGY    Procedure:  Pulmonary angiogram, PE thrombectomy    Meds:  Versed 0 mg IV   Fentanyl 75  mcg IV    MD:  Maisha    Complications:  None    Contrast:  70 cc    Findings:  Dilated right heart and PA's, c/w chronic pulmonary HTN.    Large volume PE thrombectomy including saddle PE and central right PE. Small volume left PE.    PAP:  Baseline: 44/14, mean 27  Post-thrombectomy: 38/20, mean 26    O2 sats dropped to 79% via mask, respiratory present and placed BIPAP with marked improvement.  Following thrombectomy, O2 sats 97% on 5% oxymask.    Plan:  Full dose anticoagulation. Bedreset 6 hours.

## 2023-03-28 NOTE — PROGRESS NOTES
Pt arrived from IR. Pt is alert and oriented, follows all commands. Right groin site CDI. Pedal pulses 2+ bilaterally. Pt continues to complain of chest pain 7/10. Pt on 5L oxygen via facemask.Heparin running at 1500 u/hr. Will recheck anti-xa at 2330 per protocol. S/O at bedside with pt. See flow sheet for full assessment.

## 2023-03-28 NOTE — PROGRESS NOTES
"[facility]    Medicine Progress Note - Hospitalist Service    Date of Admission:  3/27/2023    Assessment & Plan   Michell Orozco is a 61 yo female admitted on 03/27/23 for LE DVT, acute pulmonary embolism and respiratory failure    Acute hypoxic respiratory failure  Acute bilateral pulmonary embolism with corpulmonale  Acute right DVT  -Ultrasound duplex lower extremity: Extensive acute DVT of right lower extremity  -Outside facility CTA chest: Large burden acute bilateral pulmonary emboli with marked right heart strain, but no evidence of pulmonary infarction.  -Status post PE thrombectomy on 3/27  -On heparin GTT. Will switch to DOAC upon discharge  -Monitor CBC, bleeding  -Oxygen via NC, wean as able  -IR consult appreciated    GERD  -c/w PTA protonix     Diet: Regular Diet Adult    DVT Prophylaxis: heparin gtt  [place] Catheter: Not present  Lines: None     Cardiac Monitoring: ACTIVE order. Indication: ICU  Code Status: Full Code      Clinically Significant Risk Factors Present on Admission               # Coagulation Defect: INR = 1.42 (Ref range: 0.85 - 1.15) and/or PTT = 185 Seconds (Ref range: 22 - 38 Seconds), will monitor for bleeding      # Acute Respiratory Failure: Documented O2 saturation < 91%.  Continue supplemental oxygen as needed     # Overweight: Estimated body mass index is 27.02 kg/m  as calculated from the following:    Height as of this encounter: 1.702 m (5' 7\").    Weight as of this encounter: 78.2 kg (172 lb 8 oz).           Disposition Plan     Expected Discharge Date: 03/29/2023                  Alyx Mcgovern MD  Hospitalist Service  [facility]  Securely message with [employer] (more info)  Text page via [employer] Paging/Directory   ______________________________________________________________________    Interval History   Patient is new to me.  Patient seen and examined at the bedside.  Patient complains of left-sided pleuritic chest pain.  " Currently on 2L NC oxygen. Denied bleeding. No h/o VTE.     Physical Exam   Vital Signs: Temp: 98.4  F (36.9  C) Temp src: Oral BP: 125/71 Pulse: 83   Resp: (!) 31 SpO2: 99 % O2 Device: Nasal cannula Oxygen Delivery: 2 LPM  Weight: 172 lbs 8 oz      GEN: Alert and oriented. Not in acute distress.  HEENT: Atraumatic, mucous membrane- moist and pink.  Chest: Bilateral air entry.  CVS: S1S2 regular. No murmurs, rubs or gallops.  Abdomen: Soft. Non-tender, non-distended. No organomegaly. No guarding or rigidity. Bowel sounds active.   Extremities: No pedal edema.  CNS: No focal neurologic deficit. No involuntary movements.  Skin: No skin rash, no cyanosis or clubbing.     Medical Decision Making             Data     I have personally reviewed the following data over the past 24 hrs:    9.1  \   13.5   / 184     138 102 17.4 /  96   4.0 20 (L) 0.87 \       Trop: 47 (H) BNP: 7,484 (H)       INR:  1.42 (H) PTT:  185 (HH)   D-dimer:  N/A Fibrinogen:  N/A

## 2023-03-28 NOTE — CONSULTS
Care Management Initial Consult    General Information  Assessment completed with: Patient,    Type of CM/SW Visit: Initial Assessment    Primary Care Provider verified and updated as needed: Yes   Readmission within the last 30 days: no previous admission in last 30 days      Reason for Consult: discharge planning  Advance Care Planning:            Communication Assessment  Patient's communication style: spoken language (English or Bilingual)    Hearing Difficulty or Deaf: no   Wear Glasses or Blind: no    Cognitive  Cognitive/Neuro/Behavioral: WDL                      Living Environment:   People in home: alone     Current living Arrangements: other (see comments) ([facility])      Able to return to prior arrangements: yes       Family/Social Support:  Care provided by: spouse/significant other  Provides care for: no one  Marital Status: Single  Significant Other, Sibling(s)       Jesus  Description of Support System: Supportive         Current Resources:   Patient receiving home care services: No     Community Resources: [facility] Worker (for insurance)  Equipment currently used at home: none  Supplies currently used at home: None    Employment/Financial:  Employment Status: unemployed        Financial Concerns: unemployed   Referral to Financial Worker: No (pt declined, knows how to contact [facility] for more resources)       Lifestyle & Psychosocial Needs:  Social Determinants of Health     Tobacco Use: Medium Risk     Smoking Tobacco Use: Former     Smokeless Tobacco Use: Never     Passive Exposure: Not on file   Alcohol Use: Not on file   Financial Resource Strain: Not on file   Food Insecurity: Not on file   Transportation Needs: Not on file   Physical Activity: Not on file   Stress: Not on file   Social Connections: Not on file   Intimate Partner Violence: Not on file   Depression: Not on file   Housing Stability: Not on file       Functional Status:  Prior to admission patient needed assistance:   Dependent  ADLs:: Independent  Dependent IADLs:: Independent       Mental Health Status:  Mental Health Status: No Current Concerns (denies concerns)       Chemical Dependency Status:  Chemical Dependency Status: No Current Concerns (denies concerns)             Values/Beliefs:  Spiritual, Cultural Beliefs, Mosque Practices, Values that affect care: other (see comments) (Yazidi)               Additional Information:  RNCM met with patient, introduced self and CM role.  Pt lives alone in a townhouse.  Pt independent at baseline, no community/home care services, no DME in the home.  Pt's boyfriend Jesus supportive.  One sister, and one brother who live locally and are supportive.  Pt unemployed at the moment, knows of how to contact [facility] for resources.      Pt hopes to discharge to home with friend/family transport.    Pt would be agreeable to home care if recommended, has no home care agency preferences.    3/27 - Pulmonary angiogram, PE thrombectomy     CM will continue to follow.    Kirill Granados RN

## 2023-03-29 LAB
ANION GAP SERPL CALCULATED.3IONS-SCNC: 11 MMOL/L (ref 7–15)
BASOPHILS # BLD AUTO: 0 10E3/UL (ref 0–0.2)
BASOPHILS NFR BLD AUTO: 0 %
BUN SERPL-MCNC: 16.7 MG/DL (ref 8–23)
CALCIUM SERPL-MCNC: 8.4 MG/DL (ref 8.8–10.2)
CHLORIDE SERPL-SCNC: 100 MMOL/L (ref 98–107)
CREAT SERPL-MCNC: 0.79 MG/DL (ref 0.51–0.95)
DEPRECATED HCO3 PLAS-SCNC: 21 MMOL/L (ref 22–29)
EOSINOPHIL # BLD AUTO: 0.1 10E3/UL (ref 0–0.7)
EOSINOPHIL NFR BLD AUTO: 1 %
ERYTHROCYTE [DISTWIDTH] IN BLOOD BY AUTOMATED COUNT: 12.8 % (ref 10–15)
GFR SERPL CREATININE-BSD FRML MDRD: 85 ML/MIN/1.73M2
GLUCOSE SERPL-MCNC: 101 MG/DL (ref 70–99)
HCT VFR BLD AUTO: 34.7 % (ref 35–47)
HGB BLD-MCNC: 11.6 G/DL (ref 11.7–15.7)
IMM GRANULOCYTES # BLD: 0 10E3/UL
IMM GRANULOCYTES NFR BLD: 0 %
LYMPHOCYTES # BLD AUTO: 2.2 10E3/UL (ref 0.8–5.3)
LYMPHOCYTES NFR BLD AUTO: 30 %
MCH RBC QN AUTO: 28.7 PG (ref 26.5–33)
MCHC RBC AUTO-ENTMCNC: 33.4 G/DL (ref 31.5–36.5)
MCV RBC AUTO: 86 FL (ref 78–100)
MONOCYTES # BLD AUTO: 0.6 10E3/UL (ref 0–1.3)
MONOCYTES NFR BLD AUTO: 9 %
NEUTROPHILS # BLD AUTO: 4.4 10E3/UL (ref 1.6–8.3)
NEUTROPHILS NFR BLD AUTO: 60 %
NRBC # BLD AUTO: 0 10E3/UL
NRBC BLD AUTO-RTO: 0 /100
PLATELET # BLD AUTO: 188 10E3/UL (ref 150–450)
POTASSIUM SERPL-SCNC: 3.1 MMOL/L (ref 3.4–5.3)
POTASSIUM SERPL-SCNC: 3.6 MMOL/L (ref 3.4–5.3)
RBC # BLD AUTO: 4.04 10E6/UL (ref 3.8–5.2)
SODIUM SERPL-SCNC: 132 MMOL/L (ref 136–145)
UFH PPP CHRO-ACNC: 0.36 IU/ML
UFH PPP CHRO-ACNC: 0.38 IU/ML
WBC # BLD AUTO: 7.3 10E3/UL (ref 4–11)

## 2023-03-29 PROCEDURE — 84132 ASSAY OF SERUM POTASSIUM: CPT | Performed by: STUDENT IN AN ORGANIZED HEALTH CARE EDUCATION/TRAINING PROGRAM

## 2023-03-29 PROCEDURE — 85004 AUTOMATED DIFF WBC COUNT: CPT | Performed by: STUDENT IN AN ORGANIZED HEALTH CARE EDUCATION/TRAINING PROGRAM

## 2023-03-29 PROCEDURE — 36415 COLL VENOUS BLD VENIPUNCTURE: CPT | Performed by: STUDENT IN AN ORGANIZED HEALTH CARE EDUCATION/TRAINING PROGRAM

## 2023-03-29 PROCEDURE — 99232 SBSQ HOSP IP/OBS MODERATE 35: CPT | Performed by: STUDENT IN AN ORGANIZED HEALTH CARE EDUCATION/TRAINING PROGRAM

## 2023-03-29 PROCEDURE — 85520 HEPARIN ASSAY: CPT | Performed by: INTERNAL MEDICINE

## 2023-03-29 PROCEDURE — 250N000013 HC RX MED GY IP 250 OP 250 PS 637: Performed by: STUDENT IN AN ORGANIZED HEALTH CARE EDUCATION/TRAINING PROGRAM

## 2023-03-29 PROCEDURE — 250N000013 HC RX MED GY IP 250 OP 250 PS 637: Performed by: INTERNAL MEDICINE

## 2023-03-29 PROCEDURE — 120N000001 HC R&B MED SURG/OB

## 2023-03-29 PROCEDURE — 250N000011 HC RX IP 250 OP 636: Performed by: EMERGENCY MEDICINE

## 2023-03-29 PROCEDURE — 85520 HEPARIN ASSAY: CPT | Performed by: HOSPITALIST

## 2023-03-29 PROCEDURE — 36415 COLL VENOUS BLD VENIPUNCTURE: CPT | Performed by: HOSPITALIST

## 2023-03-29 PROCEDURE — 80048 BASIC METABOLIC PNL TOTAL CA: CPT | Performed by: STUDENT IN AN ORGANIZED HEALTH CARE EDUCATION/TRAINING PROGRAM

## 2023-03-29 RX ORDER — SUMATRIPTAN 50 MG/1
100 TABLET, FILM COATED ORAL
Status: DISCONTINUED | OUTPATIENT
Start: 2023-03-29 | End: 2023-03-30

## 2023-03-29 RX ORDER — TOPIRAMATE 25 MG/1
25 TABLET, FILM COATED ORAL AT BEDTIME
Status: DISCONTINUED | OUTPATIENT
Start: 2023-03-29 | End: 2023-03-31 | Stop reason: HOSPADM

## 2023-03-29 RX ORDER — SUMATRIPTAN 20 MG/1
20 SPRAY NASAL
Status: DISCONTINUED | OUTPATIENT
Start: 2023-03-29 | End: 2023-03-29

## 2023-03-29 RX ORDER — POTASSIUM CHLORIDE 1500 MG/1
40 TABLET, EXTENDED RELEASE ORAL ONCE
Status: COMPLETED | OUTPATIENT
Start: 2023-03-29 | End: 2023-03-29

## 2023-03-29 RX ADMIN — PANTOPRAZOLE SODIUM 40 MG: 40 TABLET, DELAYED RELEASE ORAL at 06:42

## 2023-03-29 RX ADMIN — ACETAMINOPHEN 650 MG: 325 TABLET, FILM COATED ORAL at 06:43

## 2023-03-29 RX ADMIN — TOPIRAMATE 25 MG: 25 TABLET, FILM COATED ORAL at 21:23

## 2023-03-29 RX ADMIN — ACETAMINOPHEN 650 MG: 325 TABLET, FILM COATED ORAL at 18:42

## 2023-03-29 RX ADMIN — Medication 2.5 MG: at 06:44

## 2023-03-29 RX ADMIN — Medication 5 MG: at 18:42

## 2023-03-29 RX ADMIN — HEPARIN SODIUM AND DEXTROSE 1500 UNITS/HR: 10000; 5 INJECTION INTRAVENOUS at 08:01

## 2023-03-29 RX ADMIN — APIXABAN 10 MG: 5 TABLET, FILM COATED ORAL at 10:45

## 2023-03-29 RX ADMIN — POTASSIUM CHLORIDE 40 MEQ: 1500 TABLET, EXTENDED RELEASE ORAL at 08:08

## 2023-03-29 RX ADMIN — APIXABAN 10 MG: 5 TABLET, FILM COATED ORAL at 21:20

## 2023-03-29 ASSESSMENT — ACTIVITIES OF DAILY LIVING (ADL)
ADLS_ACUITY_SCORE: 21
ADLS_ACUITY_SCORE: 30
ADLS_ACUITY_SCORE: 24
ADLS_ACUITY_SCORE: 31
ADLS_ACUITY_SCORE: 30
ADLS_ACUITY_SCORE: 24
ADLS_ACUITY_SCORE: 28
ADLS_ACUITY_SCORE: 24
ADLS_ACUITY_SCORE: 30
ADLS_ACUITY_SCORE: 24
ADLS_ACUITY_SCORE: 30
ADLS_ACUITY_SCORE: 30

## 2023-03-29 NOTE — PLAN OF CARE
[facility] - ICU    RN Progress Note:            Pertinent Assessments:      Please refer to flowsheet rows for full assessment     VSS, left pleuritic chest pain during movement, rated  9-10/10 rate, medicated with oxycodone and tylenol prn, in tears when moving. Verbalized she did not want to be turned again when she is sleeping. Explained to patient the need be turned to prevent bed sore. Heparin gtt running, on high intensity protocol, addressed couple times this shift. O2 2-4L/ NC.          Mobility Level:     2, turn Q 2 hrs    Barriers to Progression on heparin gtt, pain and SOB with activity         Key Events - This Shift:   Bladder scanned at 0600 for 488 ml, voided large amount, but missed the bed pan, post void bladder scan 0.   Looking depressed and unmotivated.    Text paged MD re: low potassium and low sodium level. K protocol ordered, addressed.        Barriers to Discharge / Downgrade:     on heparin gtt, pain and SOB with activity

## 2023-03-29 NOTE — PROGRESS NOTES
Patient ambulated in room without difficulty.  Walked to bathroom, now upright in chair.  Mild dyspnea upon exertion.  Resting comfortably.  Will continue to monitor.

## 2023-03-29 NOTE — PROGRESS NOTES
"[facility]    Medicine Progress Note - Hospitalist Service    Date of Admission:  3/27/2023    Assessment & Plan   Michell Orozco is a 63 yo female admitted on 03/27/23 for LE DVT, acute pulmonary embolism and respiratory failure    Acute hypoxic respiratory failure  Acute bilateral pulmonary embolism with corpulmonale  Acute right DVT  -Ultrasound duplex lower extremity: Extensive acute DVT of right lower extremity  -Outside facility CTA chest: Large burden acute bilateral pulmonary emboli with marked right heart strain, but no evidence of pulmonary infarction.  -Status post PE thrombectomy on 3/27  -Monitor CBC, bleeding  -Oxygen via NC, wean as able  -IR consult appreciated  -s/p heparin gtt. Switched to eliquis    Hypokalemia  - replete per protocol    Hyponatremia, mild  - repeat BMP in am    GERD  -c/w PTA protonix    Migraine headache  -c/w PTA topiramate  -c/w PTA sumatriptan prn         Diet: Regular Diet Adult    DVT Prophylaxis: DOAC  [place] Catheter: Not present  Lines: None     Cardiac Monitoring: ACTIVE order. Indication: ICU  Code Status: Full Code      Clinically Significant Risk Factors        # Hypokalemia: Lowest K = 3.1 mmol/L in last 2 days, will replace as needed                 # Overweight: Estimated body mass index is 26.86 kg/m  as calculated from the following:    Height as of this encounter: 1.702 m (5' 7\").    Weight as of this encounter: 77.8 kg (171 lb 8 oz)., PRESENT ON ADMISSION         Disposition Plan      Expected Discharge Date: 03/31/2023      Destination: home            Alyx Mcgovern MD  Hospitalist Service  [facility]  Securely message with [employer] (more info)  Text page via [employer] Paging/Directory   ______________________________________________________________________    Interval History   Patient seen and examined at bedside. Patient's breathing is better. She c/o pleuritic chest pain. No fever, chills, bleeding. Had " headache  In the morning.     Physical Exam   Vital Signs: Temp: 97.8  F (36.6  C) Temp src: Oral BP: 135/71 Pulse: 91   Resp: (!) 40 SpO2: 98 % O2 Device: None (Room air) Oxygen Delivery: 1 LPM  Weight: 171 lbs 8 oz    GEN: Alert and oriented. Not in acute distress.  HEENT: Atraumatic, mucous membrane- moist and pink.  Chest: Bilateral air entry.  CVS: S1S2 regular. No murmurs, rubs or gallops.  Abdomen: Soft. Non-tender, non-distended. No organomegaly. No guarding or rigidity. Bowel sounds active.   Extremities: No pedal edema.  CNS: No focal neurologic deficit. No involuntary movements.  Skin: No skin rash, no cyanosis or clubbing.     Medical Decision Making             Data     I have personally reviewed the following data over the past 24 hrs:    7.3  \   11.6 (L)   / 188     132 (L) 100 16.7 /  101 (H)   3.6 21 (L) 0.79 \

## 2023-03-29 NOTE — PLAN OF CARE
Pt still reported some intermittent left side pleuritic chest pain, but declined intervention this evening. Pt stated Oxycodone did not help pain go away, just make her feel tired. Vitals stable. Ho Benz RN

## 2023-03-30 ENCOUNTER — APPOINTMENT (OUTPATIENT)
Dept: PHYSICAL THERAPY | Facility: HOSPITAL | Age: 63
End: 2023-03-30
Attending: STUDENT IN AN ORGANIZED HEALTH CARE EDUCATION/TRAINING PROGRAM
Payer: COMMERCIAL

## 2023-03-30 LAB
ANION GAP SERPL CALCULATED.3IONS-SCNC: 12 MMOL/L (ref 7–15)
ATRIAL RATE - MUSE: 88 BPM
BASOPHILS # BLD AUTO: 0 10E3/UL (ref 0–0.2)
BASOPHILS NFR BLD AUTO: 0 %
BUN SERPL-MCNC: 9.5 MG/DL (ref 8–23)
CALCIUM SERPL-MCNC: 8.6 MG/DL (ref 8.8–10.2)
CHLORIDE SERPL-SCNC: 101 MMOL/L (ref 98–107)
CREAT SERPL-MCNC: 0.82 MG/DL (ref 0.51–0.95)
DEPRECATED HCO3 PLAS-SCNC: 20 MMOL/L (ref 22–29)
DIASTOLIC BLOOD PRESSURE - MUSE: NORMAL MMHG
EOSINOPHIL # BLD AUTO: 0 10E3/UL (ref 0–0.7)
EOSINOPHIL NFR BLD AUTO: 0 %
ERYTHROCYTE [DISTWIDTH] IN BLOOD BY AUTOMATED COUNT: 12.8 % (ref 10–15)
GFR SERPL CREATININE-BSD FRML MDRD: 81 ML/MIN/1.73M2
GLUCOSE SERPL-MCNC: 94 MG/DL (ref 70–99)
HCT VFR BLD AUTO: 37.4 % (ref 35–47)
HGB BLD-MCNC: 12.3 G/DL (ref 11.7–15.7)
IMM GRANULOCYTES # BLD: 0 10E3/UL
IMM GRANULOCYTES NFR BLD: 0 %
INTERPRETATION ECG - MUSE: NORMAL
LYMPHOCYTES # BLD AUTO: 1.7 10E3/UL (ref 0.8–5.3)
LYMPHOCYTES NFR BLD AUTO: 28 %
MCH RBC QN AUTO: 28.1 PG (ref 26.5–33)
MCHC RBC AUTO-ENTMCNC: 32.9 G/DL (ref 31.5–36.5)
MCV RBC AUTO: 86 FL (ref 78–100)
MONOCYTES # BLD AUTO: 0.4 10E3/UL (ref 0–1.3)
MONOCYTES NFR BLD AUTO: 7 %
NEUTROPHILS # BLD AUTO: 3.8 10E3/UL (ref 1.6–8.3)
NEUTROPHILS NFR BLD AUTO: 65 %
NRBC # BLD AUTO: 0 10E3/UL
NRBC BLD AUTO-RTO: 0 /100
P AXIS - MUSE: 32 DEGREES
PLATELET # BLD AUTO: 235 10E3/UL (ref 150–450)
POTASSIUM SERPL-SCNC: 3.4 MMOL/L (ref 3.4–5.3)
POTASSIUM SERPL-SCNC: 4 MMOL/L (ref 3.4–5.3)
PR INTERVAL - MUSE: 228 MS
QRS DURATION - MUSE: 84 MS
QT - MUSE: 368 MS
QTC - MUSE: 445 MS
R AXIS - MUSE: -23 DEGREES
RBC # BLD AUTO: 4.37 10E6/UL (ref 3.8–5.2)
SODIUM SERPL-SCNC: 133 MMOL/L (ref 136–145)
SYSTOLIC BLOOD PRESSURE - MUSE: NORMAL MMHG
T AXIS - MUSE: -19 DEGREES
TROPONIN T SERPL HS-MCNC: 21 NG/L
VENTRICULAR RATE- MUSE: 88 BPM
WBC # BLD AUTO: 5.9 10E3/UL (ref 4–11)

## 2023-03-30 PROCEDURE — 97116 GAIT TRAINING THERAPY: CPT | Mod: GP

## 2023-03-30 PROCEDURE — 93005 ELECTROCARDIOGRAM TRACING: CPT

## 2023-03-30 PROCEDURE — 93005 ELECTROCARDIOGRAM TRACING: CPT | Performed by: STUDENT IN AN ORGANIZED HEALTH CARE EDUCATION/TRAINING PROGRAM

## 2023-03-30 PROCEDURE — 82310 ASSAY OF CALCIUM: CPT | Performed by: STUDENT IN AN ORGANIZED HEALTH CARE EDUCATION/TRAINING PROGRAM

## 2023-03-30 PROCEDURE — 97110 THERAPEUTIC EXERCISES: CPT | Mod: GP

## 2023-03-30 PROCEDURE — 250N000013 HC RX MED GY IP 250 OP 250 PS 637: Performed by: STUDENT IN AN ORGANIZED HEALTH CARE EDUCATION/TRAINING PROGRAM

## 2023-03-30 PROCEDURE — 36415 COLL VENOUS BLD VENIPUNCTURE: CPT | Performed by: STUDENT IN AN ORGANIZED HEALTH CARE EDUCATION/TRAINING PROGRAM

## 2023-03-30 PROCEDURE — 93010 ELECTROCARDIOGRAM REPORT: CPT | Performed by: INTERNAL MEDICINE

## 2023-03-30 PROCEDURE — 84484 ASSAY OF TROPONIN QUANT: CPT | Performed by: STUDENT IN AN ORGANIZED HEALTH CARE EDUCATION/TRAINING PROGRAM

## 2023-03-30 PROCEDURE — 250N000013 HC RX MED GY IP 250 OP 250 PS 637: Performed by: INTERNAL MEDICINE

## 2023-03-30 PROCEDURE — 99232 SBSQ HOSP IP/OBS MODERATE 35: CPT | Performed by: STUDENT IN AN ORGANIZED HEALTH CARE EDUCATION/TRAINING PROGRAM

## 2023-03-30 PROCEDURE — 99207 PR CDG-CUT & PASTE-POTENTIAL IMPACT ON LEVEL: CPT | Performed by: STUDENT IN AN ORGANIZED HEALTH CARE EDUCATION/TRAINING PROGRAM

## 2023-03-30 PROCEDURE — 84132 ASSAY OF SERUM POTASSIUM: CPT | Performed by: STUDENT IN AN ORGANIZED HEALTH CARE EDUCATION/TRAINING PROGRAM

## 2023-03-30 PROCEDURE — 97162 PT EVAL MOD COMPLEX 30 MIN: CPT | Mod: GP

## 2023-03-30 PROCEDURE — 85025 COMPLETE CBC W/AUTO DIFF WBC: CPT | Performed by: STUDENT IN AN ORGANIZED HEALTH CARE EDUCATION/TRAINING PROGRAM

## 2023-03-30 PROCEDURE — 99222 1ST HOSP IP/OBS MODERATE 55: CPT | Mod: 25 | Performed by: INTERNAL MEDICINE

## 2023-03-30 PROCEDURE — 120N000001 HC R&B MED SURG/OB

## 2023-03-30 RX ORDER — POTASSIUM CHLORIDE 1500 MG/1
40 TABLET, EXTENDED RELEASE ORAL ONCE
Status: COMPLETED | OUTPATIENT
Start: 2023-03-30 | End: 2023-03-30

## 2023-03-30 RX ADMIN — ACETAMINOPHEN 650 MG: 325 TABLET, FILM COATED ORAL at 07:04

## 2023-03-30 RX ADMIN — POTASSIUM CHLORIDE 40 MEQ: 1500 TABLET, EXTENDED RELEASE ORAL at 12:18

## 2023-03-30 RX ADMIN — Medication 2.5 MG: at 12:16

## 2023-03-30 RX ADMIN — PANTOPRAZOLE SODIUM 40 MG: 40 TABLET, DELAYED RELEASE ORAL at 07:05

## 2023-03-30 RX ADMIN — APIXABAN 10 MG: 5 TABLET, FILM COATED ORAL at 09:13

## 2023-03-30 RX ADMIN — Medication 5 MG: at 07:05

## 2023-03-30 RX ADMIN — ACETAMINOPHEN 650 MG: 325 TABLET, FILM COATED ORAL at 16:36

## 2023-03-30 RX ADMIN — APIXABAN 10 MG: 5 TABLET, FILM COATED ORAL at 20:41

## 2023-03-30 RX ADMIN — TOPIRAMATE 25 MG: 25 TABLET, FILM COATED ORAL at 20:42

## 2023-03-30 RX ADMIN — Medication 5 MG: at 16:37

## 2023-03-30 RX ADMIN — ACETAMINOPHEN 650 MG: 325 TABLET, FILM COATED ORAL at 12:16

## 2023-03-30 ASSESSMENT — ACTIVITIES OF DAILY LIVING (ADL)
ADLS_ACUITY_SCORE: 21

## 2023-03-30 NOTE — PROGRESS NOTES
"[facility]    Medicine Progress Note - Hospitalist Service    Date of Admission:  3/27/2023    Assessment & Plan   Michell Orozco is a 61 yo female admitted on 03/27/23 for LE DVT, acute pulmonary embolism and respiratory failure    Acute hypoxic respiratory failure  Acute bilateral pulmonary embolism with corpulmonale  Acute right DVT  -Ultrasound duplex lower extremity: Extensive acute DVT of right lower extremity  -Outside facility CTA chest: Large burden acute bilateral pulmonary emboli with marked right heart strain, but no evidence of pulmonary infarction.  -Status post PE thrombectomy on 3/27  -Monitor CBC, bleeding  -Oxygen via NC, wean as able  -IR consult appreciated  -s/p heparin gtt. Switched to eliquis    Hypokalemia  - replete per protocol    Hyponatremia, mild  - repeat BMP in am    GERD  -c/w PTA protonix    Migraine headache  -c/w PTA topiramate  -c/w PTA sumatriptan prn    Palpitations  -telemetry- runs of aflutter  -f/u EKG and troponins  -cardiology consulted, appreciate recommendations       Diet: Regular Diet Adult    DVT Prophylaxis: DOAC  [place] Catheter: Not present  Lines: None     Cardiac Monitoring: ACTIVE order. Indication: ICU  Code Status: Full Code      Clinically Significant Risk Factors        # Hypokalemia: Lowest K = 3.1 mmol/L in last 2 days, will replace as needed                 # Overweight: Estimated body mass index is 26.86 kg/m  as calculated from the following:    Height as of this encounter: 1.702 m (5' 7\").    Weight as of this encounter: 77.8 kg (171 lb 8 oz)., PRESENT ON ADMISSION         Disposition Plan     Expected Discharge Date: 03/31/2023    Discharge Delays: Specialist Consult (enter specialist & decision needed in comments)  Destination: home            Alyx Mcgovern MD  Hospitalist Service  [facility]  Securely message with [employer] (more info)  Text page via [employer] Paging/Directory "   ______________________________________________________________________    Interval History   Patient seen and examined at bedside. Patient has some palpitations and left pleuritic chest pain. No fever, chills, nausea, vomiting. No bleeding.     Physical Exam   Vital Signs: Temp: 97.6  F (36.4  C) Temp src: Oral BP: 125/78 Pulse: 70   Resp: 18 SpO2: 96 % O2 Device: None (Room air)    Weight: 171 lbs 8 oz      GEN: Alert and oriented. Not in acute distress.  HEENT: Atraumatic, mucous membrane- moist and pink.  Chest: Bilateral air entry.  CVS: S1S2 regular. No murmurs, rubs or gallops.  Abdomen: Soft. Non-tender, non-distended. No organomegaly. No guarding or rigidity. Bowel sounds active.   Extremities: No pedal edema.  CNS: No focal neurologic deficit. No involuntary movements.  Skin: No skin rash, no cyanosis or clubbing.     Medical Decision Making             Data     I have personally reviewed the following data over the past 24 hrs:    5.9  \   12.3   / 235     133 (L) 101 9.5 /  94   3.4 20 (L) 0.82 \

## 2023-03-30 NOTE — PLAN OF CARE
Problem: Plan of Care - These are the overarching goals to be used throughout the patient stay.    Goal: Optimal Comfort and Wellbeing  Outcome: Progressing  Intervention: Provide Person-Centered Care  Recent Flowsheet Documentation  Taken 3/29/2023 2200 by WAYNE LOW  Trust Relationship/Rapport:   care explained   choices provided  Taken 3/29/2023 1900 by WAYNE LOW  Trust Relationship/Rapport:   care explained   choices provided     Problem: Plan of Care - These are the overarching goals to be used throughout the patient stay.    Goal: Optimal Comfort and Wellbeing  Intervention: Provide Person-Centered Care  Recent Flowsheet Documentation  Taken 3/29/2023 2200 by WAYNE LOW  Trust Relationship/Rapport:   care explained   choices provided  Taken 3/29/2023 1900 by WAYNE LOW  Trust Relationship/Rapport:   care explained   choices provided     Problem: Risk for Delirium  Goal: Improved Behavioral Control  Intervention: Minimize Safety Risk  Recent Flowsheet Documentation  Taken 3/29/2023 2200 by WAYNE LOW  Enhanced Safety Measures: bed alarm set  Trust Relationship/Rapport:   care explained   choices provided  Taken 3/29/2023 1900 by WAYNE LOW  Enhanced Safety Measures: bed alarm set  Trust Relationship/Rapport:   care explained   choices provided   Goal Outcome Evaluation:    Pt arrived from ICU this evening; pt presented to hospital with dyspnea and SOB; found to have saddle PE; pt still has dyspnea upon exertion; sats in mid to low 90s on RA; NSR; diminshed LS; SL IV with scant amount of blood under dressing; A&OX4; pt brought nasal spray imitrex from home for frequent migraines currently labeled and in pts med drawer; vss; somewhat tachy when ambulating; able to ambulate w assist of 1 short distances; able to make needs known

## 2023-03-30 NOTE — CONSULTS
Thank you, Dr. Israel ref. provider found, for asking the [facility] Care team to see Michell Orozco to evaluate .      Assessment/Recommendations   Assessment:    Large/saddle pulmonary emboli status post IR mechanical thrombectomy, hemodynamically stable/no signs of RV failure/mildly elevated PA pressure and normal TAPSE by echo  Paroxysmal atrial flutter, resolved, likely sequelae of acutely increased right atrial pressure  DVT right lower extremity appears to be provoked    Plan:  Continue Eliquis  Consider low-dose beta-blocker if there is recurrence of atrial flutter.  I would anticipated with improvement of pulmonary hemodynamics there will be less incentive to have more episode of atrial flutter.  I fully expect that atrial flutter is only transitory.         History of Present Illness/Subjective    Ms. Michell Orozco is a 62 year old female who came into the emergency room with shortness of breath and chest pain.  She was found to have large saddle pulmonary emboli.  She underwent mechanical thrombectomy by IR service.  She feels better although still experiencing some chest pain.  Earlier today she felt heart palpitations.  Telemetry showed brief episodes of atrial flutter.  She has no history of atrial dysrhythmias.  She denies any cardiac history.  She reports that she been suffering from back pain as she has been less active lately.  She has no previous history of DVT or PE.  There is no family history of DVT or PE    ECG: Personally reviewed.  Normal sinus rhythm with T wave inversion in precordial leads    ECHO (personnaly Reviewed):   1. The left ventricle is normal in size. Left ventricular function is  decreased. The ejection fraction is 45-50% (mildly reduced). There is mild  concentric left ventricular hypertrophy.  2. Flattened septum is consistent with RV pressure overload.  3. The right ventricle is mildly dilated. Moderately decreased right  ventricular systolic function  4. There is mild  "(1+) mitral regurgitation.  5. The right ventricular systolic pressure is approximated at 34mmHg plus the  right atrial pressure. Inferior vena cava not well visualized for estimation  of right atrial pressure.           Physical Examination Review of Systems   /78 (BP Location: Right arm)   Pulse 70   Temp 97.6  F (36.4  C) (Oral)   Resp 18   Ht 1.702 m (5' 7\")   Wt 77.8 kg (171 lb 8 oz)   SpO2 96%   BMI 26.86 kg/m    Body mass index is 26.86 kg/m .  Wt Readings from Last 3 Encounters:   03/29/23 77.8 kg (171 lb 8 oz)   03/23/23 83.9 kg (185 lb)       Intake/Output Summary (Last 24 hours) at 3/30/2023 1504  Last data filed at 3/30/2023 1200  Gross per 24 hour   Intake 760 ml   Output --   Net 760 ml     General Appearance:   Alert, cooperative, no distress, appears stated age   Head/ENT: Normocephalic, without obvious abnormality. Membranes moist.      EYES:  No scleral icterus   Neck: Supple, symmetrical, trachea midline, no adenopathy, thyroid: not enlarged, symmetric, no carotid bruit or JVD   Chest/Lungs:   lungs are clear to auscultation, respirations unlabored. No tenderness or deformity   Cardiovascular:   Regular rhythm, S1, S2 normal, no murmur, rub or gallop.   Abdomen:  Soft, non-tender, bowel sounds active all four quadrants,  no masses, no organomegaly   Extremities: no cyanosis or clubbing. No edema   Skin: Skin color, texture, turgor normal, no rashes or lesions.    Neurologic: Alert and oriented x 3, moving all four extremities.    Psychiatric: Normal affect.      10 system review of systems completed see history of present illness and inpatient H&P (reviewed) for further details.          Lab Results    Chemistry/lipid CBC Cardiac Enzymes/BNP/TSH/INR   No results for input(s): CHOL, HDL, LDL, TRIG, CHOLHDLRATIO in the last 24588 hours.  No results for input(s): LDL in the last 13597 hours.  Recent Labs   Lab Test 03/30/23  0721   *   POTASSIUM 3.4   CHLORIDE 101   CO2 20*   GLC " 94   BUN 9.5   CR 0.82   GFRESTIMATED 81   NANI 8.6*     Recent Labs   Lab Test 03/30/23  0721 03/29/23  0540 03/28/23  0723   CR 0.82 0.79 0.87     No results for input(s): A1C in the last 35819 hours.       Recent Labs   Lab Test 03/30/23  0721   WBC 5.9   HGB 12.3   HCT 37.4   MCV 86        Recent Labs   Lab Test 03/30/23  0721 03/29/23  0540 03/28/23 0723   HGB 12.3 11.6* 13.5    No results for input(s): TROPONINI in the last 16761 hours.  Recent Labs   Lab Test 03/27/23  1708   NTBNPI 7,484*     No results for input(s): TSH in the last 27733 hours.  Recent Labs   Lab Test 03/27/23  1708   INR 1.42*        Medical History  Surgical History Family History Social History   Low back pain Past Surgical History:   Procedure Laterality Date     IR PULMONARY ANGIOGRAM BILATERAL  3/27/2023     No premature CAD, SCD,cardiomyopathy   Social History     Socioeconomic History     Marital status: Single     Spouse name: Not on file     Number of children: Not on file     Years of education: Not on file     Highest education level: Not on file   Occupational History     Not on file   Tobacco Use     Smoking status: Former     Types: Cigarettes     Smokeless tobacco: Never   Substance and Sexual Activity     Alcohol use: Not on file     Drug use: Not on file     Sexual activity: Not on file   Other Topics Concern     Not on file   Social History Narrative     Not on file     Social Determinants of Health     Financial Resource Strain: Not on file   Food Insecurity: Not on file   Transportation Needs: Not on file   Physical Activity: Not on file   Stress: Not on file   Social Connections: Not on file   Intimate Partner Violence: Not on file   Housing Stability: Not on file         Medications  Allergies   Current Facility-Administered Medications Ordered in Epic   Medication Dose Route Frequency Provider Last Rate Last Admin     acetaminophen (TYLENOL) tablet 650 mg  650 mg Oral Q4H PRN Alyx Mcgovern MD   650 mg at  03/30/23 1216     apixaban ANTICOAGULANT (ELIQUIS) tablet 10 mg  10 mg Oral BID Alyx Mcgovern MD   10 mg at 03/30/23 0913    Followed by     [START ON 4/5/2023] apixaban ANTICOAGULANT (ELIQUIS) tablet 5 mg  5 mg Oral BID Alyx Mcgovern MD         glucose gel 15-30 g  15-30 g Oral Q15 Min PRN Alyx Mcgovern MD        Or     dextrose 50 % injection 25-50 mL  25-50 mL Intravenous Q15 Min PRN Alyx Mcgovern MD        Or     glucagon injection 1 mg  1 mg Subcutaneous Q15 Min PRN Alyx Mcgovern MD         Medication Instruction: Stop tPA (alteplase) infusion IF any signs of major systemic bleeding, any neurological deterioration, development of severe headache, sudden severe elevation of BP, or new nausea or vomiting AND notify provider   Does not apply Continuous PRN Alyx Mcgovern MD         naloxone (NARCAN) injection 0.2 mg  0.2 mg Intravenous Q2 Min PRN Alyx Mcgovern MD        Or     naloxone (NARCAN) injection 0.4 mg  0.4 mg Intravenous Q2 Min PRN Alyx Mcgovern MD        Or     naloxone (NARCAN) injection 0.2 mg  0.2 mg Intramuscular Q2 Min PRN Alyx Mcgovern MD        Or     naloxone (NARCAN) injection 0.4 mg  0.4 mg Intramuscular Q2 Min PRN Alyx Mcgovern MD         oxyCODONE IR (ROXICODONE) half-tab 2.5-5 mg  2.5-5 mg Oral Q4H PRN Alyx Mcgovern MD   2.5 mg at 03/30/23 1216     pantoprazole (PROTONIX) EC tablet 40 mg  40 mg Oral QAM AC Alyx Mcgovern MD   40 mg at 03/30/23 0705     Patient is already receiving anticoagulation with heparin, enoxaparin (LOVENOX), warfarin (COUMADIN)  or other anticoagulant medication   Does not apply Continuous PRN Alyx Mcgovern MD         polyethylene glycol (MIRALAX) Packet 17 g  17 g Oral Daily PRN Alyx Mcgovern MD         senna-docusate (SENOKOT-S/PERICOLACE) 8.6-50 MG per tablet 1 tablet  1 tablet Oral BID JAGDEEPN Alyx Mcgovern MD        Or     senna-docusate (SENOKOT-S/PERICOLACE) 8.6-50 MG per  tablet 2 tablet  2 tablet Oral BID PRN Alyx Mcgovern MD         topiramate (TOPAMAX) tablet 25 mg  25 mg Oral At Bedtime Alyx Mcgovern MD   25 mg at 03/29/23 2123     No current Epic-ordered outpatient medications on file.     No Known Allergies

## 2023-03-30 NOTE — PROGRESS NOTES
03/30/23 0838   Appointment Info   Signing Clinician's Name / Credentials (PT) Ltaasha Redd PT   Living Environment   People in Home alone   Current Living Arrangements house  ([facility])   Home Accessibility stairs within home   Number of Stairs, Within Home, Primary greater than 10 stairs   Stair Railings, Within Home, Primary railings safe and in good condition   Transportation Anticipated family or friend will provide   Living Environment Comments once up the 13 steps pt can live on one level   Self-Care   Usual Activity Tolerance good   Current Activity Tolerance moderate   Equipment Currently Used at Home none   Activity/Exercise/Self-Care Comment usually indep all ADL's, drives, SO is able to assist if needed   General Information   Onset of Illness/Injury or Date of Surgery 03/27/23   Referring Physician ADWOA Mcgovern   Patient/Family Therapy Goals Statement (PT) return home   Pertinent History of Current Problem (include personal factors and/or comorbidities that impact the POC) CT scan of the chest from outside facility showed saddle PE with large clot burden with marked right heart strain. US done here showed extensive acute DVT extending from the distal right femoral vein through the popliteal vein and into the posterior tibial and peroneal veins. She was taken to IR for pulmonary angiogram with PE thrombectomy.   General Observations pt states she is sleepy, but agrees to PT   Cognition   Affect/Mental Status (Cognition) WFL   Orientation Status (Cognition) oriented x 4   Follows Commands (Cognition) WFL   Pain Assessment   Patient Currently in Pain No   Range of Motion (ROM)   ROM Comment LE ROM WFL   Strength (Manual Muscle Testing)   Strength Comments BLE strength 4/5   Bed Mobility   Bed Mobility supine-sit   Supine-Sit Robertson (Bed Mobility) modified independence;verbal cues   Assistive Device (Bed Mobility) bed rails   Comment, (Bed Mobility) sitting balance is good   Transfers   Transfers  sit-stand transfer   Sit-Stand Transfer   Sit-Stand [place] (Transfers) contact guard   Assistive Device (Sit-Stand Transfers) walker, front-wheeled   Comment, (Sit-Stand Transfer) walker used for safety, balance   Gait/Stairs (Locomotion)   [place] Level (Gait) contact guard   Assistive Device (Gait) walker, front-wheeled   Distance in Feet 25   Distance in Feet (Gait) 150'   Pattern (Gait) step-through   Deviations/Abnormal Patterns (Gait) myrtle decreased   Comment, (Gait/Stairs) slower pace to begin with, no LOB   Clinical Impression   Criteria for Skilled Therapeutic Intervention Yes, treatment indicated   PT Diagnosis (PT) impaired functional mobility   Influenced by the following impairments fatigue   Functional limitations due to impairments transfers, gait   Clinical Presentation (PT Evaluation Complexity) Stable/Uncomplicated   Clinical Presentation Rationale presents as diagnosed   Clinical Decision Making (Complexity) moderate complexity   Planned Therapy Interventions (PT) bed mobility training;transfer training;gait training;stair training;strengthening   Anticipated Equipment Needs at Discharge (PT) walker, rolling  (or most likely will not need)   Risk & Benefits of therapy have been explained care plan/treatment goals reviewed;patient   PT Total Evaluation Time   PT Eval, Moderate Complexity Minutes (01153) 10   Physical Therapy Goals   PT Frequency Daily   PT Predicted Duration/Target Date for Goal Attainment 04/05/23   PT Goals Transfers;Gait;Stairs   PT: Transfers Independent;Sit to/from stand;Bed to/from chair   PT: Gait Supervision/stand-by assist;150 feet  (with/without AD)   PT: Stairs Supervision/stand-by assist;Greater than 10 stairs  (1 rail)   Interventions   Interventions Quick Adds Gait Training;Therapeutic Procedure   Therapeutic Procedure/Exercise   Ther. Procedure: strength, endurance, ROM, flexibillity Minutes (33494) 10   Symptoms Noted During/After Treatment none    Treatment Detail/Skilled Intervention seated LE ex with direction and demo, 10 reps each, O2 sats remain in the 90's through out, RA   Gait Training   Gait Training Minutes (33377) 13   Symptoms Noted During/After Treatment (Gait Training) fatigue;shortness of breath   Treatment Detail/Skilled Intervention slower pace but steady, no LOB, preferred to use the FWW this am, should be able to transition to no AD   [place] Level (Gait Training) contact guard   Physical Assistance Level (Gait Training) verbal cues;1 person assist   Weight Bearing (Gait Training) full weight-bearing   Assistive Device (Gait Training) rolling walker   Gait Analysis Deviations decreased myrtle   PT Discharge Planning   PT Plan transfers, gt with FWW vs. no AD, LE ex, add stairs   PT Discharge Recommendation (DC Rec) home with assist   PT Rationale for DC Rec assist of family/friends as needed   PT Brief overview of current status indep bed mobility, trnasfers and gt with FWW, 150, CGA   Total Session Time   Timed Code Treatment Minutes 23   Total Session Time (sum of timed and untimed services) 33

## 2023-03-30 NOTE — PROGRESS NOTES
Care Management Follow Up    Length of Stay (days): 3    Expected Discharge Date: 03/31/2023     Concerns to be Addressed: denies needs/concerns at this time     Patient plan of care discussed at interdisciplinary rounds: Yes    Anticipated Discharge Disposition: Home with assist      Anticipated Discharge Services:  Home with assist     Education Provided on the Discharge Plan:  Per Care Team   Patient/Family in Agreement with the Plan:  Yes    Referrals Placed by CM/SW:    Private pay costs discussed: Not applicable    Additional Information:  Chart reviewed.    Background:  Lives alone, townhouse. Independent at baseline. No services. Boyfriend Jesus & siblings for support. Anticipates discharge to home with friends/family transport.     CM updates:  Therapy recs home with assist. Boyfriend, and family can assist.       Cm will continue to follow plan of care, review recommendations, and assist with any discharge needs anticipated.       Renetta Soriano RN

## 2023-03-30 NOTE — PLAN OF CARE
"Goal Outcome Evaluation:    Pt A/O x4. Denied pain. Tylenol and oxycodone available. Tele SR with first degree AV block. Tolerating RA, cont. Pulse ox on. LS are diminished and c/o dyspnea with exertion. RR 28, denies SOB. Pt does experience migraines, home nasal spray is labeled and available. Assist of 1 with transfers. Regular diet. VSS.    /84 (BP Location: Right arm)   Pulse 80   Temp 99.3  F (37.4  C) (Oral)   Resp 28   Ht 1.702 m (5' 7\")   Wt 77.8 kg (171 lb 8 oz)   SpO2 95%   BMI 26.86 kg/m        "

## 2023-03-30 NOTE — PLAN OF CARE
Problem: Plan of Care - These are the overarching goals to be used throughout the patient stay.    Goal: Absence of Hospital-Acquired Illness or Injury  Outcome: Progressing  Intervention: Identify and Manage Fall Risk  Recent Flowsheet Documentation  Taken 3/30/2023 1545 by Bing Delaney RN  Safety Promotion/Fall Prevention:   bed alarm on   room organization consistent   safety round/check completed  Taken 3/30/2023 1255 by Bing Delaney RN  Safety Promotion/Fall Prevention:   bed alarm on   room organization consistent   safety round/check completed  Taken 3/30/2023 1025 by Bing Delaney RN  Safety Promotion/Fall Prevention:   bed alarm on   room organization consistent   safety round/check completed  Goal: Optimal Comfort and Wellbeing  Outcome: Progressing  Goal: Readiness for Transition of Care  Outcome: Progressing     Problem: Risk for Delirium  Goal: Improved Behavioral Control  Intervention: Minimize Safety Risk  Recent Flowsheet Documentation  Taken 3/30/2023 1545 by Bing Delaney RN  Enhanced Safety Measures: bed alarm set  Taken 3/30/2023 1255 by Bing Delaney RN  Enhanced Safety Measures: bed alarm set  Taken 3/30/2023 1025 by Bing Delaney RN  Enhanced Safety Measures: bed alarm set   Goal Outcome Evaluation: Patient alert and oriented. Pain controlled with PO acetaminophen and oxycodone. Episode of [id number] notified. Continuing to monitor.

## 2023-03-31 ENCOUNTER — APPOINTMENT (OUTPATIENT)
Dept: PHYSICAL THERAPY | Facility: HOSPITAL | Age: 63
End: 2023-03-31
Payer: COMMERCIAL

## 2023-03-31 VITALS
DIASTOLIC BLOOD PRESSURE: 78 MMHG | SYSTOLIC BLOOD PRESSURE: 124 MMHG | WEIGHT: 171.5 LBS | BODY MASS INDEX: 26.92 KG/M2 | RESPIRATION RATE: 18 BRPM | HEIGHT: 67 IN | TEMPERATURE: 98 F | OXYGEN SATURATION: 94 % | HEART RATE: 96 BPM

## 2023-03-31 LAB
ANION GAP SERPL CALCULATED.3IONS-SCNC: 10 MMOL/L (ref 7–15)
BASOPHILS # BLD AUTO: 0 10E3/UL (ref 0–0.2)
BASOPHILS NFR BLD AUTO: 0 %
BUN SERPL-MCNC: 9.9 MG/DL (ref 8–23)
CALCIUM SERPL-MCNC: 9.2 MG/DL (ref 8.8–10.2)
CHLORIDE SERPL-SCNC: 103 MMOL/L (ref 98–107)
CREAT SERPL-MCNC: 0.82 MG/DL (ref 0.51–0.95)
DEPRECATED HCO3 PLAS-SCNC: 22 MMOL/L (ref 22–29)
EOSINOPHIL # BLD AUTO: 0 10E3/UL (ref 0–0.7)
EOSINOPHIL NFR BLD AUTO: 0 %
ERYTHROCYTE [DISTWIDTH] IN BLOOD BY AUTOMATED COUNT: 12.9 % (ref 10–15)
GFR SERPL CREATININE-BSD FRML MDRD: 81 ML/MIN/1.73M2
GLUCOSE SERPL-MCNC: 93 MG/DL (ref 70–99)
HCT VFR BLD AUTO: 36.7 % (ref 35–47)
HGB BLD-MCNC: 12 G/DL (ref 11.7–15.7)
IMM GRANULOCYTES # BLD: 0 10E3/UL
IMM GRANULOCYTES NFR BLD: 0 %
LYMPHOCYTES # BLD AUTO: 1.9 10E3/UL (ref 0.8–5.3)
LYMPHOCYTES NFR BLD AUTO: 36 %
MAGNESIUM SERPL-MCNC: 2 MG/DL (ref 1.7–2.3)
MCH RBC QN AUTO: 28.4 PG (ref 26.5–33)
MCHC RBC AUTO-ENTMCNC: 32.7 G/DL (ref 31.5–36.5)
MCV RBC AUTO: 87 FL (ref 78–100)
MONOCYTES # BLD AUTO: 0.4 10E3/UL (ref 0–1.3)
MONOCYTES NFR BLD AUTO: 8 %
NEUTROPHILS # BLD AUTO: 3 10E3/UL (ref 1.6–8.3)
NEUTROPHILS NFR BLD AUTO: 56 %
NRBC # BLD AUTO: 0 10E3/UL
NRBC BLD AUTO-RTO: 0 /100
PLATELET # BLD AUTO: 267 10E3/UL (ref 150–450)
POTASSIUM SERPL-SCNC: 4.3 MMOL/L (ref 3.4–5.3)
RBC # BLD AUTO: 4.22 10E6/UL (ref 3.8–5.2)
SODIUM SERPL-SCNC: 135 MMOL/L (ref 136–145)
WBC # BLD AUTO: 5.3 10E3/UL (ref 4–11)

## 2023-03-31 PROCEDURE — 99239 HOSP IP/OBS DSCHRG MGMT >30: CPT | Performed by: STUDENT IN AN ORGANIZED HEALTH CARE EDUCATION/TRAINING PROGRAM

## 2023-03-31 PROCEDURE — 83735 ASSAY OF MAGNESIUM: CPT | Performed by: STUDENT IN AN ORGANIZED HEALTH CARE EDUCATION/TRAINING PROGRAM

## 2023-03-31 PROCEDURE — 250N000013 HC RX MED GY IP 250 OP 250 PS 637: Performed by: STUDENT IN AN ORGANIZED HEALTH CARE EDUCATION/TRAINING PROGRAM

## 2023-03-31 PROCEDURE — 99232 SBSQ HOSP IP/OBS MODERATE 35: CPT | Performed by: INTERNAL MEDICINE

## 2023-03-31 PROCEDURE — 97530 THERAPEUTIC ACTIVITIES: CPT | Mod: GP

## 2023-03-31 PROCEDURE — 82310 ASSAY OF CALCIUM: CPT | Performed by: STUDENT IN AN ORGANIZED HEALTH CARE EDUCATION/TRAINING PROGRAM

## 2023-03-31 PROCEDURE — 97116 GAIT TRAINING THERAPY: CPT | Mod: GP

## 2023-03-31 PROCEDURE — 85025 COMPLETE CBC W/AUTO DIFF WBC: CPT | Performed by: STUDENT IN AN ORGANIZED HEALTH CARE EDUCATION/TRAINING PROGRAM

## 2023-03-31 PROCEDURE — 36415 COLL VENOUS BLD VENIPUNCTURE: CPT | Performed by: STUDENT IN AN ORGANIZED HEALTH CARE EDUCATION/TRAINING PROGRAM

## 2023-03-31 RX ADMIN — PANTOPRAZOLE SODIUM 40 MG: 40 TABLET, DELAYED RELEASE ORAL at 09:08

## 2023-03-31 RX ADMIN — APIXABAN 10 MG: 5 TABLET, FILM COATED ORAL at 09:08

## 2023-03-31 ASSESSMENT — ACTIVITIES OF DAILY LIVING (ADL)
ADLS_ACUITY_SCORE: 20
ADLS_ACUITY_SCORE: 21
ADLS_ACUITY_SCORE: 20
ADLS_ACUITY_SCORE: 21

## 2023-03-31 NOTE — PROGRESS NOTES
"    Cardiology Progress Note    Assessment:  Large/saddle pulmonary emboli status post IR mechanical thrombectomy, hemodynamically stable/no signs of RV failure/mildly elevated PA pressure and normal TAPSE by echo  Paroxysmal atrial flutter, resolved, likely sequelae of acutely increased right atrial pressure, no new episodes in the last 24 hours  Borderline to mildly depressed LV systolic function likely due to interventricular septal motion abnormalities related to acute PE, no fluid overload  DVT right lower extremity appears to be provoked      Plan:  Continue to monitor on telemetry  Should have repeat echo in 3 months to reassess pulmonary pressures and RV function and LV function    Subjective:   Feels much better denies chest pain or shortness of breath    Objective:   /76 (BP Location: Right arm)   Pulse 89   Temp 99  F (37.2  C) (Oral)   Resp 22   Ht 1.702 m (5' 7\")   Wt 77.8 kg (171 lb 8 oz)   SpO2 93%   BMI 26.86 kg/m      Intake/Output Summary (Last 24 hours) at 3/31/2023 1000  Last data filed at 3/30/2023 1904  Gross per 24 hour   Intake 720 ml   Output --   Net 720 ml         Physical Exam:  GENERAL: no distress  NECK: No JVD  LUNGS: Clear to auscultation.  CARDIAC: regular  rhythm, S1 & S2 normal.  No heaves, thrills, gallops or murmurs.  ABDOMEN: flat, negative hepatosplenomegaly, soft and non-tender.  EXTREMITIES: No evidence of cyanosis, clubbing or edema.    Current Facility-Administered Medications Ordered in Epic   Medication Dose Route Frequency Provider Last Rate Last Admin     acetaminophen (TYLENOL) tablet 650 mg  650 mg Oral Q4H PRN Alyx Mcgovern MD   650 mg at 03/30/23 1636     apixaban ANTICOAGULANT (ELIQUIS) tablet 10 mg  10 mg Oral BID Alyx Mcgovern MD   10 mg at 03/31/23 0908    Followed by     [START ON 4/5/2023] apixaban ANTICOAGULANT (ELIQUIS) tablet 5 mg  5 mg Oral BID Alyx Mcgovern MD         glucose gel 15-30 g  15-30 g Oral Q15 Min PRN Shaniqua, " MD Alyx        Or     dextrose 50 % injection 25-50 mL  25-50 mL Intravenous Q15 Min PRN Alyx Mcgovern MD        Or     glucagon injection 1 mg  1 mg Subcutaneous Q15 Min PRN Alyx Mcgovern MD         Medication Instruction: Stop tPA (alteplase) infusion IF any signs of major systemic bleeding, any neurological deterioration, development of severe headache, sudden severe elevation of BP, or new nausea or vomiting AND notify provider   Does not apply Continuous PRN Alyx Mcgovern MD         naloxone (NARCAN) injection 0.2 mg  0.2 mg Intravenous Q2 Min PRN Alyx Mcgovern MD        Or     naloxone (NARCAN) injection 0.4 mg  0.4 mg Intravenous Q2 Min PRN Alyx Mcgovern MD        Or     naloxone (NARCAN) injection 0.2 mg  0.2 mg Intramuscular Q2 Min PRN Alyx Mcgovern MD        Or     naloxone (NARCAN) injection 0.4 mg  0.4 mg Intramuscular Q2 Min PRN Alyx Mcgovern MD         oxyCODONE IR (ROXICODONE) half-tab 2.5-5 mg  2.5-5 mg Oral Q4H PRN Alyx Mcgovern MD   5 mg at 03/30/23 1637     pantoprazole (PROTONIX) EC tablet 40 mg  40 mg Oral QAM AC Alyx Mcgovern MD   40 mg at 03/31/23 0908     Patient is already receiving anticoagulation with heparin, enoxaparin (LOVENOX), warfarin (COUMADIN)  or other anticoagulant medication   Does not apply Continuous PRN Alyx Mcgovern MD         polyethylene glycol (MIRALAX) Packet 17 g  17 g Oral Daily PRN Alyx Mcgovern MD         senna-docusate (SENOKOT-S/PERICOLACE) 8.6-50 MG per tablet 1 tablet  1 tablet Oral BID PRN Alyx Mcgovern MD        Or     senna-docusate (SENOKOT-S/PERICOLACE) 8.6-50 MG per tablet 2 tablet  2 tablet Oral BID PRN Alyx Mcgovern MD         topiramate (TOPAMAX) tablet 25 mg  25 mg Oral At Bedtime Alyx Mcgovern MD   25 mg at 03/30/23 2042     No current [facility]-ordered outpatient medications on file.       Cardiographics:    Telemetry: Normal sinus rhythm and sinus tachycardia  ECHO  (personnaly Reviewed):   1. The left ventricle is normal in size. Left ventricular function is  decreased. The ejection fraction is 45-50% (mildly reduced). There is mild  concentric left ventricular hypertrophy.  2. Flattened septum is consistent with RV pressure overload.  3. The right ventricle is mildly dilated. Moderately decreased right  ventricular systolic function  4. There is mild (1+) mitral regurgitation.  5. The right ventricular systolic pressure is approximated at 34mmHg plus the  right atrial pressure. Inferior vena cava not well visualized for estimation  of right atrial pressure.     Lab Results    Chemistry/lipid CBC Cardiac Enzymes/BNP/TSH/INR   No results for input(s): CHOL, HDL, LDL, TRIG, CHOLHDLRATIO in the last 86031 hours.  No results for input(s): LDL in the last 67796 hours.  Recent Labs   Lab Test 03/31/23  0639   *   POTASSIUM 4.3   CHLORIDE 103   CO2 22   GLC 93   BUN 9.9   CR 0.82   GFRESTIMATED 81   NANI 9.2     Recent Labs   Lab Test 03/31/23  0639 03/30/23  0721 03/29/23  0540   CR 0.82 0.82 0.79     No results for input(s): A1C in the last 32013 hours.       Recent Labs   Lab Test 03/31/23  0639   WBC 5.3   HGB 12.0   HCT 36.7   MCV 87        Recent Labs   Lab Test 03/31/23  0639 03/30/23  0721 03/29/23  0540   HGB 12.0 12.3 11.6*    No results for input(s): TROPONINI in the last 59010 hours.  Recent Labs   Lab Test 03/27/23  1708   NTBNPI 7,484*     No results for input(s): TSH in the last 91216 hours.  Recent Labs   Lab Test 03/27/23  1708   INR 1.42*

## 2023-03-31 NOTE — PROGRESS NOTES
Patient has been assessed for Home Oxygen needs.     Pulse oximetry (SpO2) and Oxygen flow readings:    SpO2 =  94% on room air at rest while awake.    SpO2 improved to  94% on 0  liters/minute at rest.    SpO2 =  93% on room air during activity/with exercise.    *SpO2 improved to 93 % on 0 liters/minute during activity/with exercise.

## 2023-03-31 NOTE — PLAN OF CARE
Physical Therapy Discharge Summary    Reason for therapy discharge:    Discharged to home.    Progress towards therapy goal(s). See goals on Care Plan in [facility] electronic health record for goal details.  Goals met    Therapy recommendation(s):    No further therapy is recommended.

## 2023-03-31 NOTE — PLAN OF CARE
Problem: Pain Acute  Goal: Optimal Pain Control and Function  Intervention: Prevent or Manage Pain  Recent Flowsheet Documentation  Taken 3/30/2023 2105 by Bee Maza, RN  Medication Review/Management: medications reviewed     Problem: VTE (Venous Thromboembolism)  Goal: VTE (Venous Thromboembolism) Symptom Resolution  3/31/2023 0335 by Bee Maza, RN  Outcome: Progressing  3/31/2023 0334 by Bee Maza RN  Outcome: Progressing   Goal Outcome Evaluation:    Patient has been stable overnight. Intermittent pain in L chest/side reported but tolerable.  Declines pain meds at this time. Denies sob. Reports some numbness to RLE. Pulse to BLE palpable but weak.

## 2023-03-31 NOTE — PROGRESS NOTES
Care Management Discharge Note    Discharge Date: 03/31/2023     Discharge Disposition: Home    Discharge Services: None    Discharge DME: None    Discharge Transportation:  (Family)    Private pay costs discussed: Not applicable    PAS Confirmation Code:  (NA)  Patient/family educated on Medicare website which has current facility and service quality ratings: no    Education Provided on the Discharge Plan:    Persons Notified of Discharge Plans: Nursing;   Patient/Family in Agreement with the Plan: yes    Handoff Referral Completed: No    Additional Information:  Discharge orders noted; no CM needs identified.     Juana Ellis RN

## 2023-03-31 NOTE — DISCHARGE SUMMARY
Patient A/O x 3 this morning. No c/o pain. Respirations clear and patient passed home O2 eval without need for O2 upon discharge.     Discharge instructions reviewed with patient, by nursing staff. All questions by patient and significant other answered prior to discharge. Patient left hospital via wheelchair.

## 2023-03-31 NOTE — DISCHARGE SUMMARY
[facility]  Hospitalist Discharge Summary      Date of Admission:  3/27/2023  Date of Discharge:  3/31/2023  Discharging Provider: Alyx Mcgovern MD  Discharge Service: Hospitalist Service    Discharge Diagnoses   Pulmonary embolism, right DVT, paroxysmal aflutter    Follow-ups Needed After Discharge   Follow-up Appointments     Follow-up and recommended labs and tests       Follow up with primary care provider (PCP), Aruna Packer NP, within 7   days for hospital follow- up and regarding new diagnosis.  The following   labs/tests are recommended: Echo in 3 months to reassess pulmonary   pressures and RV function and LV function. Age appropriate and risk factor   based cancer screening test per PCP. Cardiology follow up as scheduled   with Arthur Hogan.             Unresulted Labs Ordered in the Past 30 Days of this Admission     No orders found from 2/25/2023 to 3/28/2023.          Discharge Disposition   Discharged to home  Condition at discharge: Stable      Hospital Course   Michell Orozco is a 63 yo female admitted on 03/27/23 for LE DVT, acute pulmonary embolism and respiratory failure  Acute hypoxic respiratory failure  Acute bilateral pulmonary embolism with corpulmonale  Acute right DVT  -Ultrasound duplex lower extremity: Extensive acute DVT of right lower extremity  -Outside facility CTA chest: Large burden acute bilateral pulmonary emboli with marked right heart strain, but no evidence of pulmonary infarction.  -Status post PE thrombectomy on 3/27  -Monitor CBC, bleeding  -IR consult appreciated  -s/p heparin gtt. Switched to eliquis  -off of oxygen.  Hypokalemia, resolved  - replete per protocol  Hyponatremia, mild- improving  GERD  -c/w PTA protonix  Migraine headache  -c/w PTA topiramate  -c/w PTA sumatriptan prn  Paroxysmal aflutter  -telemetry- runs of aflutter  -Cardiology will make arrangement for OP follow up. Discussed with Dr. Arthur Swanson.  Patient is  clinically and hemodynamically stable for discharge to home. Medication reconciliation was done. Medications sent to patient's preferred pharmacy. Follow up appointments, recommendations and tests as shown below. Patient verbalized understanding and agreed to plan of care. All questions answered.     Consultations This Hospital Stay   PHARMACY IP CONSULT  CARE MANAGEMENT / SOCIAL WORK IP CONSULT  PHYSICAL THERAPY ADULT IP CONSULT  CARDIOLOGY IP CONSULT    Code Status   Full Code    Time Spent on this Encounter   I, Alyx Mcgovern MD, personally saw the patient today and spent greater than 30 minutes discharging this patient.       Alyx Mcgovern MD  [address] [address]  Phone: [phone]  Fax: [phone]  ______________________________________________________________________    Physical Exam   Vital Signs: Temp: 98  F (36.7  C) Temp src: Oral BP: 124/78 Pulse: 96   Resp: 18 SpO2: 94 % O2 Device: None (Room air)    Weight: 171 lbs 8 oz    GEN: Alert and oriented. Not in acute distress.  HEENT: Atraumatic, mucous membrane- moist and pink.  Chest: Bilateral air entry.  CVS: S1S2 regular. No murmurs, rubs or gallops.  Abdomen: Soft. Non-tender, non-distended. No organomegaly. No guarding or rigidity. Bowel sounds active.   Extremities: No pedal edema.  CNS: No focal neurologic deficit. No involuntary movements.  Skin: No skin rash, no cyanosis or clubbing.   Primary Care Physician   Aruna Packer, LOKI    Discharge Orders      Follow-Up with Cardiology      Brief Discharge Instructions    Mechanical Thrombectomy Procedure:  This procedure is used to help when people have a blood clot(s) blocking one or more of the large blood vessels within the body. It involves inserting a catheter (thin flexible tube) with a device into the blood stream so that clot within blood vessel can be removed. This treatment can reduce the long-term effects of clot  burden. Please follow the below instructions as you recover:    Care instructions after angiogram procedure:  -  If you received sedation for your procedure, do not drive or operate heavy machinery for the rest of the day.  -  Do not lift objects greater than 10 pounds for 2 days following the procedure.  -  Avoid excessive exercise and straining for 2 days.  -  Avoid tub baths, pools, hot tubs and Jacuzzis for 3 days or until procedure site is well healed.  -  You may shower beginning tomorrow. Do not scrub procedure site until well healed; pat dry.  -  Return to your normal activities as you tolerate after the 2 day restriction.   -  You can expect to return to work 1-2 days after your procedure - depending on the nature of your profession.  -  It is normal to have some tenderness and minimal swelling at procedure puncture site. A small area of discoloration may be present. Tenderness typically subsides in 1-2 days. A small knot may also be present at puncture site for 6-8 weeks. This can be a normal part of the healing process.    Please seek medical evaluation for:  - If you develop fevers (greater than 101 F (38.3C)).  - If you develop increasing pain, redness, purulent drainage, tenderness, or swelling at procedure site.  - If you experience any bleeding from procedure/puncture site: lie down, firmly apply pressure to puncture site and call 911.  - Seek emergent evaluation if you experience any new leg/arm pain, discoloration or numbness.  - Increasing shortness of breath.  - Increasing swelling or pain within your arms/legs.    Call [facility] at [phone] with questions/concerns or if you have any of the above symptoms.     Reason for your hospital stay    Pulmonary embolism, right LE DVT     Follow-up and recommended labs and tests     Follow up with primary care provider (PCP), Aruna Packer NP, within 7 days for hospital follow- up and regarding new diagnosis.  The following labs/tests are  recommended: Echo in 3 months to reassess pulmonary pressures and RV function and LV function. Age appropriate and risk factor based cancer screening test per PCP. Cardiology follow up as scheduled with Arthur Hogan.     Activity    Your activity upon discharge: activity as tolerated.     Diet    Follow this diet upon discharge: Orders Placed This Encounter      Regular Diet Adult       Significant Results and Procedures       Discharge Medications   Current Discharge Medication List      START taking these medications    Details   !! apixaban ANTICOAGULANT (ELIQUIS) 5 MG tablet Take 2 tablets (10 mg) by mouth 2 times daily for 5 days . Take 10 mg (2 tablets of 5 mg) twice a day until the night of 04/04/23. Start taking 5 mg (1 tablet of 5 mg) twice a day starting from the morning of 04/05/23.  Qty: 18 tablet, Refills: 0    Associated Diagnoses: Subacute massive pulmonary embolism (H)      !! apixaban ANTICOAGULANT (ELIQUIS) 5 MG tablet Take 1 tablet (5 mg) by mouth 2 times daily for 30 days starting the morning of 04/05/2023.  Qty: 60 tablet, Refills: 0    Associated Diagnoses: Subacute massive pulmonary embolism (H)       !! - Potential duplicate medications found. Please discuss with provider.      CONTINUE these medications which have NOT CHANGED    Details   esomeprazole (NEXIUM) 20 MG DR capsule Take 20 mg by mouth every morning (before breakfast) Take 30-60 minutes before eating.      SUMAtriptan (IMITREX) 20 MG/ACT nasal spray Spray 1 spray in nostril every 2 hours as needed USE 1 SPRAY(S) EVERY 2 HOURS AS NEEDED. MAX 40MG/24 HOURS      topiramate (TOPAMAX) 25 MG tablet Take 1 tablet by mouth At Bedtime           Allergies   No Known Allergies

## 2023-04-02 ENCOUNTER — PATIENT OUTREACH (OUTPATIENT)
Dept: CARE COORDINATION | Facility: CLINIC | Age: 63
End: 2023-04-02
Payer: COMMERCIAL

## 2023-04-02 NOTE — PROGRESS NOTES
[facility]: Post-Discharge Note  SITUATION                                                      Admission:    Admission Date: 03/27/23   Reason for Admission: LE DVT, acute pulmonary embolism and respiratory failure  Discharge:   Discharge Date: 03/31/23  Discharge Diagnosis: Pulmonary embolism, right DVT, paroxysmal aflutter    BACKGROUND                                                      Per hospital discharge summary and inpatient provider notes:  Michell Orozco is a 61 yo female admitted on 03/27/23 for LE DVT, acute pulmonary embolism and respiratory failure  Acute hypoxic respiratory failure  Acute bilateral pulmonary embolism with corpulmonale  Acute right DVT  -Ultrasound duplex lower extremity: Extensive acute DVT of right lower extremity  -Outside facility CTA chest: Large burden acute bilateral pulmonary emboli with marked right heart strain, but no evidence of pulmonary infarction.  -Status post PE thrombectomy on 3/27  -Monitor CBC, bleeding  -IR consult appreciated  -s/p heparin gtt. Switched to eliquis  -off of oxygen.  Hypokalemia, resolved  - replete per protocol  Hyponatremia, mild- improving  GERD  -c/w PTA protonix  Migraine headache  -c/w PTA topiramate  -c/w PTA sumatriptan prn  Paroxysmal aflutter  -telemetry- runs of aflutter  -Cardiology will make arrangement for OP follow up. Discussed with Dr. Arthur Swanson.  Patient is clinically and hemodynamically stable for discharge to home. Medication reconciliation was done. Medications sent to patient's preferred pharmacy. Follow up appointments, recommendations and tests as shown below. Patient verbalized understanding and agreed to plan of care. All questions answered.        ASSESSMENT        Discharge Assessment  How are you doing now that you are home?: Better.  How are your symptoms? (Red Flag symptoms escalate to triage hotline per guidelines): Improved  Do you feel your condition is stable enough to be safe at home until your  provider visit?: Yes  Does the patient have their discharge instructions? : Yes  Does the patient have questions regarding their discharge instructions? : No  Were you started on any new medications or were there changes to any of your previous medications? : Yes  Does the patient have all of their medications?: Yes  Do you have questions regarding any of your medications? : No  Do you have all of your needed medical supplies or equipment (DME)?  (i.e. oxygen tank, CPAP, cane, etc.): Yes  Discharge follow-up appointment scheduled within 14 calendar days? : No (Will call Birdie's office tomorrow.)  Is patient agreeable to assistance with scheduling? : No (PCP is not with us.)    Post-op ([facility] CTA Only)  If the patient had a surgery or procedure, do they have any questions for a nurse?: No      PLAN                                                      Outpatient Plan:  Follow up with primary care provider (PCP), Aruna Packer NP, within 7 days for hospital follow- up and regarding new diagnosis.  The following labs/tests are recommended: Echo in 3 months to reassess pulmonary pressures and RV function and LV function. Age appropriate and risk factor based cancer screening test per PCP. Cardiology follow up as scheduled with Arthur Hogan.    No future appointments.      For any urgent concerns, please contact our 24 hour nurse triage line: [phone])         ANIYA Henao  [phone]  [facility]

## 2023-05-17 ENCOUNTER — OFFICE VISIT (OUTPATIENT)
Dept: CARDIOLOGY | Facility: CLINIC | Age: 63
End: 2023-05-17
Attending: INTERNAL MEDICINE
Payer: COMMERCIAL

## 2023-05-17 VITALS
HEIGHT: 67 IN | WEIGHT: 179.4 LBS | RESPIRATION RATE: 16 BRPM | DIASTOLIC BLOOD PRESSURE: 68 MMHG | BODY MASS INDEX: 28.16 KG/M2 | SYSTOLIC BLOOD PRESSURE: 122 MMHG | HEART RATE: 75 BPM | OXYGEN SATURATION: 100 %

## 2023-05-17 DIAGNOSIS — I48.92 PAROXYSMAL ATRIAL FLUTTER (H): Primary | ICD-10-CM

## 2023-05-17 PROCEDURE — 99214 OFFICE O/P EST MOD 30 MIN: CPT | Performed by: INTERNAL MEDICINE

## 2023-05-17 NOTE — PATIENT INSTRUCTIONS
Michell Orozco,    It was a pleasure to see you today at the [facility].     My recommendations after this visit include:    Echo and stress test in 1 month    TRISTAN Swanson MD, FACC, [facility]

## 2023-05-17 NOTE — PROGRESS NOTES
"    Cardiology Progress Note     Assessment:  Large/saddle pulmonary emboli status post IR mechanical thrombectomy in March 2023  Paroxysmal atrial flutter, resolved, likely sequelae of acutely increased right atrial pressure, no symptomatic recurrence  Borderline to mildly depressed LV systolic function likely due to interventricular septal motion abnormalities related to acute PE, no fluid overload  DVT right lower extremity appears to be provoked, resolved  Mild coronary calcification by CT consistent with coronary atherosclerosis    Plan:  Echo and stress test in 1 month  Continue Eliquis    If stress test is normal  she should continue to work on primary prevention of cardiac events    I suspect that DVT was provoked but I am a bit apprehensive about recommending to stop Eliquis in view of large thrombotic burden.  She may benefit from consultation with hematologist to determine if there is any predisposition to clotting besides immobilization.    Subjective:   This is 63 year old female who comes in today for follow-up visit.  I saw her in the hospital when she developed atrial flutter after but large saddle pulmonary emboli.  Flutter resolved spontaneously.  She did not have any evidence of hemodynamic instability or RV failure.  She has done well since discharge home.  She been taking Eliquis.  She denies recurrence of chest pain or heart palpitations.  She does not have any recurrent swelling or pain in the right leg.    Review of Systems:   Negative other than history of present illness    Objective:   /68 (BP Location: Right arm, Patient Position: Sitting, Cuff Size: Adult Regular)   Pulse 75   Resp 16   Ht 1.702 m (5' 7\")   Wt 81.4 kg (179 lb 6.4 oz)   SpO2 100%   BMI 28.10 kg/m    Physical Exam:  GENERAL: no distress  NECK: No JVD  LUNGS: Clear to auscultation.  CARDIAC: regular rhythm, S1 & S2 normal.  No heaves, thrills, gallops or murmurs.  ABDOMEN: flat, negative hepatosplenomegaly, soft " and non-tender.  EXTREMITIES: No evidence of cyanosis, clubbing or edema.    Current Outpatient Medications   Medication Sig Dispense Refill     apixaban ANTICOAGULANT (ELIQUIS ANTICOAGULANT) 5 MG tablet Take 5 mg by mouth       esomeprazole (NEXIUM) 20 MG DR capsule Take 20 mg by mouth every morning (before breakfast) Take 30-60 minutes before eating.       SUMAtriptan (IMITREX) 20 MG/ACT nasal spray Spray 1 spray in nostril every 2 hours as needed USE 1 SPRAY(S) EVERY 2 HOURS AS NEEDED. MAX 40MG/24 HOURS       topiramate (TOPAMAX) 25 MG tablet Take 1 tablet by mouth At Bedtime         Cardiographics:    ECHO: March 2023  1. The left ventricle is normal in size. Left ventricular function is  decreased. The ejection fraction is 45-50% (mildly reduced). There is mild  concentric left ventricular hypertrophy.  2. Flattened septum is consistent with RV pressure overload.  3. The right ventricle is mildly dilated. Moderately decreased right  ventricular systolic function  4. There is mild (1+) mitral regurgitation.  5. The right ventricular systolic pressure is approximated at 34mmHg plus the  right atrial pressure. Inferior vena cava not well visualized for estimation  of right atrial pressure  CT chest: 2023  Large burden acute bilateral pulmonary emboli with marked right heart strain, but no evidence of pulmonary infarction   Mild coronary calcification   Lab Results    Chemistry/lipid CBC Cardiac Enzymes/BNP/TSH/INR   No results for input(s): CHOL, HDL, LDL, TRIG, CHOLHDLRATIO in the last 88824 hours.  No results for input(s): LDL in the last 27812 hours.  Recent Labs   Lab Test 03/31/23  0639   *   POTASSIUM 4.3   CHLORIDE 103   CO2 22   GLC 93   BUN 9.9   CR 0.82   GFRESTIMATED 81   NANI 9.2     Recent Labs   Lab Test 03/31/23  0639 03/30/23  0721 03/29/23  0540   CR 0.82 0.82 0.79     No results for input(s): A1C in the last 79976 hours.       Recent Labs   Lab Test 03/31/23  0639   WBC 5.3   HGB 12.0   HCT  36.7   MCV 87        Recent Labs   Lab Test 03/31/23  0639 03/30/23  0721 03/29/23  0540   HGB 12.0 12.3 11.6*    No results for input(s): TROPONINI in the last 09331 hours.  Recent Labs   Lab Test 03/27/23  1708   NTBNPI 7,484*     No results for input(s): TSH in the last 71888 hours.  Recent Labs   Lab Test 03/27/23  1708   INR 1.42*

## 2023-05-17 NOTE — LETTER
"5/17/2023    Aruna Packer NP, NP  [address]  [place] [address]    RE: Michell Orozco       Dear Colleague,     I had the pleasure of seeing Michell Orozco in the [facility].      Cardiology Progress Note     Assessment:  Large/saddle pulmonary emboli status post IR mechanical thrombectomy in March 2023  Paroxysmal atrial flutter, resolved, likely sequelae of acutely increased right atrial pressure, no symptomatic recurrence  Borderline to mildly depressed LV systolic function likely due to interventricular septal motion abnormalities related to acute PE, no fluid overload  DVT right lower extremity appears to be provoked, resolved  Mild coronary calcification by CT consistent with coronary atherosclerosis    Plan:  Echo and stress test in 1 month  Continue Eliquis    If stress test is normal  she should continue to work on primary prevention of cardiac events    I suspect that DVT was provoked but I am a bit apprehensive about recommending to stop Eliquis in view of large thrombotic burden.  She may benefit from consultation with hematologist to determine if there is any predisposition to clotting besides immobilization.    Subjective:   This is 63 year old female who comes in today for follow-up visit.  I saw her in the hospital when she developed atrial flutter after but large saddle pulmonary emboli.  Flutter resolved spontaneously.  She did not have any evidence of hemodynamic instability or RV failure.  She has done well since discharge home.  She been taking Eliquis.  She denies recurrence of chest pain or heart palpitations.  She does not have any recurrent swelling or pain in the right leg.    Review of Systems:   Negative other than history of present illness    Objective:   /68 (BP Location: Right arm, Patient Position: Sitting, Cuff Size: Adult Regular)   Pulse 75   Resp 16   Ht 1.702 m (5' 7\")   Wt 81.4 kg (179 lb 6.4 oz)   SpO2 100%   BMI 28.10 kg/m  "   Physical Exam:  GENERAL: no distress  NECK: No JVD  LUNGS: Clear to auscultation.  CARDIAC: regular rhythm, S1 & S2 normal.  No heaves, thrills, gallops or murmurs.  ABDOMEN: flat, negative hepatosplenomegaly, soft and non-tender.  EXTREMITIES: No evidence of cyanosis, clubbing or edema.    Current Outpatient Medications   Medication Sig Dispense Refill    apixaban ANTICOAGULANT (ELIQUIS ANTICOAGULANT) 5 MG tablet Take 5 mg by mouth      esomeprazole (NEXIUM) 20 MG DR capsule Take 20 mg by mouth every morning (before breakfast) Take 30-60 minutes before eating.      SUMAtriptan (IMITREX) 20 MG/ACT nasal spray Spray 1 spray in nostril every 2 hours as needed USE 1 SPRAY(S) EVERY 2 HOURS AS NEEDED. MAX 40MG/24 HOURS      topiramate (TOPAMAX) 25 MG tablet Take 1 tablet by mouth At Bedtime         Cardiographics:    ECHO: March 2023  1. The left ventricle is normal in size. Left ventricular function is  decreased. The ejection fraction is 45-50% (mildly reduced). There is mild  concentric left ventricular hypertrophy.  2. Flattened septum is consistent with RV pressure overload.  3. The right ventricle is mildly dilated. Moderately decreased right  ventricular systolic function  4. There is mild (1+) mitral regurgitation.  5. The right ventricular systolic pressure is approximated at 34mmHg plus the  right atrial pressure. Inferior vena cava not well visualized for estimation  of right atrial pressure  CT chest: 2023  Large burden acute bilateral pulmonary emboli with marked right heart strain, but no evidence of pulmonary infarction   Mild coronary calcification   Lab Results    Chemistry/lipid CBC Cardiac Enzymes/BNP/TSH/INR   No results for input(s): CHOL, HDL, LDL, TRIG, CHOLHDLRATIO in the last 75001 hours.  No results for input(s): LDL in the last 19724 hours.  Recent Labs   Lab Test 03/31/23  0639   *   POTASSIUM 4.3   CHLORIDE 103   CO2 22   GLC 93   BUN 9.9   CR 0.82   GFRESTIMATED 81   NANI 9.2      Recent Labs   Lab Test 03/31/23  0639 03/30/23  0721 03/29/23  0540   CR 0.82 0.82 0.79     No results for input(s): A1C in the last 36108 hours.       Recent Labs   Lab Test 03/31/23  0639   WBC 5.3   HGB 12.0   HCT 36.7   MCV 87        Recent Labs   Lab Test 03/31/23  0639 03/30/23  0721 03/29/23  0540   HGB 12.0 12.3 11.6*    No results for input(s): TROPONINI in the last 34874 hours.  Recent Labs   Lab Test 03/27/23  1708   NTBNPI 7,484*     No results for input(s): TSH in the last 78158 hours.  Recent Labs   Lab Test 03/27/23  1708   INR 1.42*                        Thank you for allowing me to participate in the care of your patient.      Sincerely,     Arthur Swanson MD     [facility]  cc:   Arthur Swanson MD  [address]  [address]  [place] [address]

## 2023-06-28 ENCOUNTER — HOSPITAL ENCOUNTER (OUTPATIENT)
Dept: CARDIOLOGY | Facility: CLINIC | Age: 63
Discharge: HOME | End: 2023-06-28
Attending: INTERNAL MEDICINE
Payer: COMMERCIAL

## 2023-06-28 ENCOUNTER — HOSPITAL ENCOUNTER (OUTPATIENT)
Dept: CARDIOLOGY | Facility: CLINIC | Age: 63
Discharge: HOME | End: 2023-06-28
Attending: INTERNAL MEDICINE | Admitting: INTERNAL MEDICINE
Payer: COMMERCIAL

## 2023-06-28 DIAGNOSIS — I48.92 PAROXYSMAL ATRIAL FLUTTER (H): ICD-10-CM

## 2023-06-28 LAB
CV STRESS CURRENT BP HE: NORMAL
CV STRESS CURRENT HR HE: 101
CV STRESS CURRENT HR HE: 109
CV STRESS CURRENT HR HE: 110
CV STRESS CURRENT HR HE: 132
CV STRESS CURRENT HR HE: 133
CV STRESS CURRENT HR HE: 134
CV STRESS CURRENT HR HE: 137
CV STRESS CURRENT HR HE: 145
CV STRESS CURRENT HR HE: 146
CV STRESS CURRENT HR HE: 146
CV STRESS CURRENT HR HE: 78
CV STRESS CURRENT HR HE: 79
CV STRESS CURRENT HR HE: 79
CV STRESS CURRENT HR HE: 81
CV STRESS CURRENT HR HE: 81
CV STRESS CURRENT HR HE: 83
CV STRESS CURRENT HR HE: 84
CV STRESS CURRENT HR HE: 89
CV STRESS CURRENT HR HE: 91
CV STRESS CURRENT HR HE: 92
CV STRESS DEVIATION TIME HE: NORMAL
CV STRESS ECHO PERCENT HR HE: NORMAL
CV STRESS EXERCISE STAGE HE: NORMAL
CV STRESS EXERCISE STAGE REACHED HE: NORMAL
CV STRESS FINAL RESTING BP HE: NORMAL
CV STRESS FINAL RESTING HR HE: 78
CV STRESS MAX HR HE: 146
CV STRESS MAX TREADMILL GRADE HE: 12
CV STRESS MAX TREADMILL SPEED HE: 2.5
CV STRESS PEAK DIA BP HE: NORMAL
CV STRESS PEAK SYS BP HE: NORMAL
CV STRESS PHASE HE: NORMAL
CV STRESS PROTOCOL HE: NORMAL
CV STRESS REASON STOPPED HE: NORMAL
CV STRESS RESTING PT POSITION HE: NORMAL
CV STRESS RESTING PT POSITION HE: NORMAL
CV STRESS ST DEVIATION AMOUNT HE: NORMAL
CV STRESS ST DEVIATION ELEVATION HE: NORMAL
CV STRESS ST EVELATION AMOUNT HE: NORMAL
CV STRESS SYMPTOMS HE: NORMAL
CV STRESS TEST TYPE HE: NORMAL
CV STRESS TOTAL STAGE TIME MIN 1 HE: NORMAL
STRESS ECHO BASELINE DIASTOLIC HE: 80
STRESS ECHO BASELINE HR: 86
STRESS ECHO BASELINE SYSTOLIC BP: 118
STRESS ECHO LAST STRESS DIASTOLIC BP: 84
STRESS ECHO LAST STRESS HR: 145
STRESS ECHO LAST STRESS SYSTOLIC BP: 146
STRESS ECHO POST ESTIMATED WORKLOAD: 5.8
STRESS ECHO POST EXERCISE DUR MIN: 4
STRESS ECHO POST EXERCISE DUR SEC: 0
STRESS ECHO TARGET HR: 134

## 2023-06-28 PROCEDURE — 93016 CV STRESS TEST SUPVJ ONLY: CPT | Performed by: INTERNAL MEDICINE

## 2023-06-28 PROCEDURE — 999N000208 ECHOCARDIOGRAM COMPLETE

## 2023-06-28 PROCEDURE — 93018 CV STRESS TEST I&R ONLY: CPT | Performed by: INTERNAL MEDICINE

## 2023-06-28 PROCEDURE — 255N000002 HC RX 255 OP 636: Performed by: INTERNAL MEDICINE

## 2023-06-28 PROCEDURE — 93306 TTE W/DOPPLER COMPLETE: CPT | Mod: 26 | Performed by: INTERNAL MEDICINE

## 2023-06-28 PROCEDURE — 93017 CV STRESS TEST TRACING ONLY: CPT

## 2023-06-28 RX ADMIN — PERFLUTREN 2.5 ML: 6.52 INJECTION, SUSPENSION INTRAVENOUS at 13:33

## 2023-07-11 ENCOUNTER — TELEPHONE (OUTPATIENT)
Dept: CARDIOLOGY | Facility: CLINIC | Age: 63
End: 2023-07-11
Payer: COMMERCIAL

## 2023-07-11 DIAGNOSIS — I42.9 CARDIOMYOPATHY, UNSPECIFIED TYPE (H): Primary | ICD-10-CM

## 2023-07-11 RX ORDER — LOSARTAN POTASSIUM 25 MG/1
25 TABLET ORAL DAILY
Qty: 30 TABLET | Refills: 11 | Status: SHIPPED | OUTPATIENT
Start: 2023-07-11 | End: 2024-08-08

## 2023-07-11 NOTE — TELEPHONE ENCOUNTER
----- Message from Arthur Swanson MD sent at 6/29/2023  9:10 AM CDT -----  Mildly depressed LV systolic function  Start losartan 25 mg a day for LV dysfunction    Follow-up in 3 months

## 2023-07-11 NOTE — TELEPHONE ENCOUNTER
Call back received from Michell. She was away from her home and staying with her father to be of help to him recently.     She verbalized understanding of results and recommendations. Follow up order placed and Losartan 25 mg sent to preferred pharmacy. Medication instructions provided and what to watch for. Result letters mailed and message to schedulers to arrange follow up. -[facility]

## 2024-06-06 ENCOUNTER — TELEPHONE (OUTPATIENT)
Dept: VASCULAR SURGERY | Facility: CLINIC | Age: 64
End: 2024-06-06
Payer: COMMERCIAL

## 2024-06-06 ENCOUNTER — TRANSCRIBE ORDERS (OUTPATIENT)
Dept: VASCULAR SURGERY | Facility: CLINIC | Age: 64
End: 2024-06-06
Payer: COMMERCIAL

## 2024-06-06 DIAGNOSIS — R20.2 PARESTHESIA OF FOOT: Primary | ICD-10-CM

## 2024-06-06 DIAGNOSIS — Z86.718 PERSONAL HISTORY OF DVT (DEEP VEIN THROMBOSIS): Primary | ICD-10-CM

## 2024-06-06 NOTE — TELEPHONE ENCOUNTER
Vascular Referral Intake    Referred by: Dr. Garner of [facility] for right toe numbness, hx RLE DVT/PE    Specialty: Vascular Medicine    Specific Provider if Necessary:  MD Letty Mason    Visit Type: Vascular Medicine    Time Frame: Next Available    Testing/Imaging Needed Before Consult: Right venous duplex     Appt Note: (to be pasted into appt comments, also add where additional information is located, ie, outside images pushed to PACS, in Epic, sent to [facility], etc)  Right toe numbness, hx RLE DVT/PE

## 2024-06-12 NOTE — TELEPHONE ENCOUNTER
LVMTCB #3 to schedule ultrasound and consult with Dr. Mason.  Send letter if no response. [phone]

## 2024-08-08 DIAGNOSIS — I42.9 CARDIOMYOPATHY, UNSPECIFIED TYPE (H): Primary | ICD-10-CM

## 2024-08-08 RX ORDER — LOSARTAN POTASSIUM 25 MG/1
25 TABLET ORAL DAILY
Qty: 90 TABLET | Refills: 0 | Status: SHIPPED | OUTPATIENT
Start: 2024-08-08

## 2024-08-08 NOTE — TELEPHONE ENCOUNTER
Received a direct call from Marcy. She is overdue for 3 month follow up from last July. She needs a refill of Losartan. Rakesh fill granted and explained rationale of medication and routine follow ups. Understanding verbalized and she was transferred to schedulers to get follow up with [facility] or [facility] arranged- next avail. -[facility] [place]

## 2024-08-25 NOTE — PROGRESS NOTES
HEART CARE ENCOUNTER NOTE      [facility]  [phone]      Assessment/Recommendations   Assessment:   Paroxysmal atrial flutter: Resolved in the hospital- occurred in setting of massive PE  Mild coronary artery calcification: Seen on CT scan in the LAD 3/27/23  Hyperlipidemia: Last LDL was 124 on 2/6/2024.  Given mild coronary artery calcification seen on CT scan would favor this number being under 100.  Will trial rosuvastatin 5 mg daily and recheck cholesterol in 3 months  History of PE: Status post IR mechanical thrombectomy in March 2023.  Follows with hematology.  No known clotting disorder.  Remains on Eliquis for anticoagulation.    Mildly reduced EF: Likely due to interventricular septal wall abnormalities related to acute PE. Echo 6/28/23 showed EF 45%.   Palpitations: Patient notes having sensation of racing heart/palpitations a couple times a day lasting for a few minutes. Denies chest pain with it or SOB.  Patient does not remember if it feels similar to the atrial flutter that she had in the hospital.  Shortness of breath:  Patient notes that over the last few months she has noticed more shortness of breath with stairs and walking in her house.  Patient notes that she has not been doing a ton and unsure if it is related to deconditioning.  Denies any chest discomfort with it.  Denies the shortness of breath occurring with the palpitations.  Patient notes it is not as extreme as when she had her PE.  Remains on her blood thinner.    Plan:   Will obtain nuclear stress test to evaluate the shortness of breath with exertion as well as reassess EF  Zio patch monitor to be mailed to home to evaluate palpitations as well as shortness of breath  If patient feels shortness of breath is worsening she should go to the ER for PE evaluation  Start rosuvastatin 5 mg daily  Fasting cholesterol check in 3 months      Follow up in 6 months with Dr. Swanson       History of Present  Illness/Subjective    HPI: Michell Orozco is a 64 year old female with PMHx of paroxysmal atrial flutter after a large saddle pulmonary embolism that resolved spontaneously, history of DVT, mild coronary artery calcification presents for follow-up.  Patient last saw Dr. Swanson 5/17/2023 where patient was denying any recurrence of chest pain or heart palpitations.  It was suspected that DVT was provoked but given the large thrombotic burden patient remained on Eliquis.  Saw hematology.  An echocardiogram was obtained 6/28/2023 that still showed mildly reduced EF of 45% and patient was started on losartan.    Patient notes that she will have a racing heart a few minutes for a couple times every day.  Notes that will occur mainly at rest.  Denies any chest discomfort or shortness of breath with the racing heart.  Does feel more short of breath over the past few months with exertion.  Patient denies the shortness of breath being as extreme as when she had her PE.  Patient denies any chest discomfort when feeling short of breath.  Notes very subtle lightheadedness if she stands up too quickly but does not feel as if she is going to faint.  Denies lower extremity edema.        Echocardiogram 6/28/23 Results:  1. The left ventricle is normal in size. Left ventricular systolic performance  is mildly reduced. The ejection fraction is estimated to be 45%.  2. There is mild global reduction in left ventricular systolic performance.  3. No significant valvular heart disease is identified on this study.  4. Mild right ventricular enlargement with low normal right ventricular  systolic performance.     When compared to the prior real-time echocardiogram dated 28 March 2023, the  right ventricle does not appear quite as enlarged on the current study. In  addition, right ventricular systolic performance appears somewhat improved and  current examination.    Echocardiogram 3/28/2023:  1. The left ventricle is normal in size. Left  ventricular function is  decreased. The ejection fraction is 45-50% (mildly reduced). There is mild  concentric left ventricular hypertrophy.  2. Flattened septum is consistent with RV pressure overload.  3. The right ventricle is mildly dilated. Moderately decreased right  ventricular systolic function  4. There is mild (1+) mitral regurgitation.  5. The right ventricular systolic pressure is approximated at 34mmHg plus the  right atrial pressure. Inferior vena cava not well visualized for estimation  of right atrial pressure.    CTA chest 3/27/2023:  ANGIOGRAM CHEST: Saddle pulmonary embolism with extensive acute-appearing emboli extending into the lobar, segmental, and subsegmental arterial branches of all lobes of both lungs. Thoracic aorta is negative for dissection. Evidence of marked right heart strain with an RV: LV ratio of 1.5 and leftward bowing of the intraventricular septum.     LUNGS AND PLEURA: Strands of scarring or atelectasis both lungs.     MEDIASTINUM/AXILLAE: Normal.     CORONARY ARTERY CALCIFICATION: Mild calcified atherosclerosis left main coronary artery.     UPPER ABDOMEN: Splenule.     MUSCULOSKELETAL: Moderate pectus excavatum.     Exercise stress test 6/28/2023:  1. Objective: No electrocardiographic evidence of ischemia.  2. Subjective: No chest pain symptoms reported with exercise.  3. Fair functional capacity for age.     Stress Summary: Patient exercised on the David protocol for a total of 4:00  minutes. Peak heart rate achieved was 146 b.p.m (92% max.) with a double-  product of 19,418. The patient stopped exercise due to dyspnea and generalized  fatigue. No chest pain symptoms were reported.       Physical Examination  Review of Systems   Vitals: BP (!) 140/73 (BP Location: Left arm, Patient Position: Sitting, Cuff Size: Adult Large)   Pulse 70   Wt 89.8 kg (198 lb)   SpO2 97%   BMI 31.01 kg/m    BMI= Body mass index is 31.01 kg/m .  Wt Readings from Last 3 Encounters:    05/17/23 81.4 kg (179 lb 6.4 oz)   03/29/23 77.8 kg (171 lb 8 oz)   03/23/23 83.9 kg (185 lb)           ENT/Mouth: membranes moist, no oral lesions or bleeding gums.      EYES:  no scleral icterus, normal conjunctivae       Chest/Lungs:   lungs are clear to auscultation, no rales or wheezing,  equal chest wall expansion    Cardiovascular:   Regular. Normal first and second heart sounds with no murmurs, rubs, or gallops; the carotid, radial and posterior tibial pulses are intact,  no edema bilaterally        Extremities: no cyanosis or clubbing   Skin: no xanthelasma, warm.    Neurologic: no tremors     Psychiatric: alert and oriented x3, calm        Please refer above for cardiac ROS details.        Medical History  Surgical History Family History Social History   No past medical history on file.  Past Surgical History:   Procedure Laterality Date    IR PULMONARY ANGIOGRAM BILATERAL  3/27/2023     No family history on file.     Social History     Socioeconomic History    Marital status: Single     Spouse name: Not on file    Number of children: Not on file    Years of education: Not on file    Highest education level: Not on file   Occupational History    Not on file   Tobacco Use    Smoking status: Former     Types: Cigarettes    Smokeless tobacco: Never   Substance and Sexual Activity    Alcohol use: Not on file    Drug use: Not on file    Sexual activity: Not on file   Other Topics Concern    Not on file   Social History Narrative    Not on file     Social Determinants of Health     Financial Resource Strain: Low Risk  (9/25/2023)    Received from [facility]    Financial Resource Strain     Difficulty of Paying Living Expenses: 3     Difficulty of Paying Living Expenses: Not on file   Food Insecurity: No Food Insecurity (9/25/2023)    Received from [facility]    Food Insecurity     Worried About Running Out of Food in the Last Year: 1  "  Transportation Needs: No Transportation Needs (9/25/2023)    [facility]    Transportation Needs     Lack of Transportation (Medical): 1   Physical Activity: Not on file   Stress: Not on file   Social Connections: Socially Integrated (9/25/2023)    Received from [facility]    Social Connections     Frequency of Communication with Friends and Family: 0   Interpersonal Safety: Not on file   Housing Stability: Low Risk  (9/25/2023)    Received from [facility]    Housing Stability     Unable to Pay for Housing in the Last Year: 1           Medications  Allergies   Current Outpatient Medications   Medication Sig Dispense Refill    apixaban ANTICOAGULANT (ELIQUIS ANTICOAGULANT) 5 MG tablet Take 5 mg by mouth      esomeprazole (NEXIUM) 20 MG DR capsule Take 20 mg by mouth every morning (before breakfast) Take 30-60 minutes before eating.      losartan (COZAAR) 25 MG tablet Take 1 tablet (25 mg) by mouth daily 90 tablet 0    SUMAtriptan (IMITREX) 20 MG/ACT nasal spray Spray 1 spray in nostril every 2 hours as needed USE 1 SPRAY(S) EVERY 2 HOURS AS NEEDED. MAX 40MG/24 HOURS      topiramate (TOPAMAX) 25 MG tablet Take 1 tablet by mouth At Bedtime       No Known Allergies       Lab Results    Chemistry/lipid CBC Cardiac Enzymes/BNP/TSH/INR   No results for input(s): \"CHOL\", \"HDL\", \"LDL\", \"TRIG\", \"CHOLHDLRATIO\" in the last 14741 hours.  No results for input(s): \"LDL\" in the last 48307 hours.  Recent Labs   Lab Test 03/31/23  0639   *   POTASSIUM 4.3   CHLORIDE 103   CO2 22   GLC 93   BUN 9.9   CR 0.82   GFRESTIMATED 81   NANI 9.2     Recent Labs   Lab Test 03/31/23  0639 03/30/23  0721 03/29/23  0540   CR 0.82 0.82 0.79     No results for input(s): \"A1C\" in the last 72246 hours.       Recent Labs   Lab Test 03/31/23  0639   WBC 5.3   HGB 12.0   HCT 36.7   MCV 87        Recent Labs   Lab Test " "03/31/23  0639 03/30/23  0721 03/29/23  0540   HGB 12.0 12.3 11.6*    No results for input(s): \"TROPONINI\" in the last 10123 hours.  Recent Labs   Lab Test 03/27/23  1708   NTBNPI 7,484*     No results for input(s): \"TSH\" in the last 90828 hours.  Recent Labs   Lab Test 03/27/23  1708   INR 1.42*          Maribel Ugarte PA-C                                       "

## 2024-08-26 ENCOUNTER — ORDERS ONLY (AUTO-RELEASED) (OUTPATIENT)
Dept: CARDIOLOGY | Facility: CLINIC | Age: 64
End: 2024-08-26
Payer: COMMERCIAL

## 2024-08-26 ENCOUNTER — OFFICE VISIT (OUTPATIENT)
Dept: CARDIOLOGY | Facility: CLINIC | Age: 64
End: 2024-08-26
Attending: INTERNAL MEDICINE
Payer: COMMERCIAL

## 2024-08-26 VITALS
HEART RATE: 70 BPM | BODY MASS INDEX: 31.01 KG/M2 | DIASTOLIC BLOOD PRESSURE: 74 MMHG | SYSTOLIC BLOOD PRESSURE: 120 MMHG | OXYGEN SATURATION: 97 % | WEIGHT: 198 LBS

## 2024-08-26 DIAGNOSIS — I25.10 CORONARY ARTERY CALCIFICATION SEEN ON CT SCAN: ICD-10-CM

## 2024-08-26 DIAGNOSIS — I26.99 SUBACUTE MASSIVE PULMONARY EMBOLISM (H): ICD-10-CM

## 2024-08-26 DIAGNOSIS — R06.09 DYSPNEA ON EXERTION: Primary | ICD-10-CM

## 2024-08-26 DIAGNOSIS — R00.2 PALPITATIONS: ICD-10-CM

## 2024-08-26 DIAGNOSIS — I42.9 CARDIOMYOPATHY, UNSPECIFIED TYPE (H): ICD-10-CM

## 2024-08-26 DIAGNOSIS — E78.5 HYPERLIPIDEMIA LDL GOAL <100: ICD-10-CM

## 2024-08-26 PROCEDURE — G2211 COMPLEX E/M VISIT ADD ON: HCPCS | Performed by: STUDENT IN AN ORGANIZED HEALTH CARE EDUCATION/TRAINING PROGRAM

## 2024-08-26 PROCEDURE — 99214 OFFICE O/P EST MOD 30 MIN: CPT | Performed by: STUDENT IN AN ORGANIZED HEALTH CARE EDUCATION/TRAINING PROGRAM

## 2024-08-26 RX ORDER — ROSUVASTATIN CALCIUM 5 MG/1
5 TABLET, COATED ORAL DAILY
Qty: 90 TABLET | Refills: 3 | Status: SHIPPED | OUTPATIENT
Start: 2024-08-26

## 2024-08-26 NOTE — LETTER
8/26/2024    Gabriele Garner MD  [address]  [place] [address]    RE: Michell Orozco       Dear Colleague,     I had the pleasure of seeing Michell Orozco in the [facility].    HEART CARE ENCOUNTER NOTE      [facility]  [phone]      Assessment/Recommendations   Assessment:   Paroxysmal atrial flutter: Resolved in the hospital- occurred in setting of massive PE  Mild coronary artery calcification: Seen on CT scan in the LAD 3/27/23  Hyperlipidemia: Last LDL was 124 on 2/6/2024.  Given mild coronary artery calcification seen on CT scan would favor this number being under 100.  Will trial rosuvastatin 5 mg daily and recheck cholesterol in 3 months  History of PE: Status post IR mechanical thrombectomy in March 2023.  Follows with hematology.  No known clotting disorder.  Remains on Eliquis for anticoagulation.    Mildly reduced EF: Likely due to interventricular septal wall abnormalities related to acute PE. Echo 6/28/23 showed EF 45%.   Palpitations: Patient notes having sensation of racing heart/palpitations a couple times a day lasting for a few minutes. Denies chest pain with it or SOB.  Patient does not remember if it feels similar to the atrial flutter that she had in the hospital.  Shortness of breath:  Patient notes that over the last few months she has noticed more shortness of breath with stairs and walking in her house.  Patient notes that she has not been doing a ton and unsure if it is related to deconditioning.  Denies any chest discomfort with it.  Denies the shortness of breath occurring with the palpitations.  Patient notes it is not as extreme as when she had her PE.  Remains on her blood thinner.    Plan:   Will obtain nuclear stress test to evaluate the shortness of breath with exertion as well as reassess EF  Zio patch monitor to be mailed to home to evaluate palpitations as well as shortness of breath  If patient feels shortness of breath  is worsening she should go to the ER for PE evaluation  Start rosuvastatin 5 mg daily  Fasting cholesterol check in 3 months      Follow up in 6 months with Dr. Swanson       History of Present Illness/Subjective    HPI: Michell Orozco is a 64 year old female with PMHx of paroxysmal atrial flutter after a large saddle pulmonary embolism that resolved spontaneously, history of DVT, mild coronary artery calcification presents for follow-up.  Patient last saw Dr. Swanson 5/17/2023 where patient was denying any recurrence of chest pain or heart palpitations.  It was suspected that DVT was provoked but given the large thrombotic burden patient remained on Eliquis.  Saw hematology.  An echocardiogram was obtained 6/28/2023 that still showed mildly reduced EF of 45% and patient was started on losartan.    Patient notes that she will have a racing heart a few minutes for a couple times every day.  Notes that will occur mainly at rest.  Denies any chest discomfort or shortness of breath with the racing heart.  Does feel more short of breath over the past few months with exertion.  Patient denies the shortness of breath being as extreme as when she had her PE.  Patient denies any chest discomfort when feeling short of breath.  Notes very subtle lightheadedness if she stands up too quickly but does not feel as if she is going to faint.  Denies lower extremity edema.        Echocardiogram 6/28/23 Results:  1. The left ventricle is normal in size. Left ventricular systolic performance  is mildly reduced. The ejection fraction is estimated to be 45%.  2. There is mild global reduction in left ventricular systolic performance.  3. No significant valvular heart disease is identified on this study.  4. Mild right ventricular enlargement with low normal right ventricular  systolic performance.     When compared to the prior real-time echocardiogram dated 28 March 2023, the  right ventricle does not appear quite as enlarged on the  current study. In  addition, right ventricular systolic performance appears somewhat improved and  current examination.    Echocardiogram 3/28/2023:  1. The left ventricle is normal in size. Left ventricular function is  decreased. The ejection fraction is 45-50% (mildly reduced). There is mild  concentric left ventricular hypertrophy.  2. Flattened septum is consistent with RV pressure overload.  3. The right ventricle is mildly dilated. Moderately decreased right  ventricular systolic function  4. There is mild (1+) mitral regurgitation.  5. The right ventricular systolic pressure is approximated at 34mmHg plus the  right atrial pressure. Inferior vena cava not well visualized for estimation  of right atrial pressure.    CTA chest 3/27/2023:  ANGIOGRAM CHEST: Saddle pulmonary embolism with extensive acute-appearing emboli extending into the lobar, segmental, and subsegmental arterial branches of all lobes of both lungs. Thoracic aorta is negative for dissection. Evidence of marked right heart strain with an RV: LV ratio of 1.5 and leftward bowing of the intraventricular septum.     LUNGS AND PLEURA: Strands of scarring or atelectasis both lungs.     MEDIASTINUM/AXILLAE: Normal.     CORONARY ARTERY CALCIFICATION: Mild calcified atherosclerosis left main coronary artery.     UPPER ABDOMEN: Splenule.     MUSCULOSKELETAL: Moderate pectus excavatum.     Exercise stress test 6/28/2023:  1. Objective: No electrocardiographic evidence of ischemia.  2. Subjective: No chest pain symptoms reported with exercise.  3. Fair functional capacity for age.     Stress Summary: Patient exercised on the David protocol for a total of 4:00  minutes. Peak heart rate achieved was 146 b.p.m (92% max.) with a double-  product of 19,418. The patient stopped exercise due to dyspnea and generalized  fatigue. No chest pain symptoms were reported.       Physical Examination  Review of Systems   Vitals: BP (!) 140/73 (BP Location: Left arm,  Patient Position: Sitting, Cuff Size: Adult Large)   Pulse 70   Wt 89.8 kg (198 lb)   SpO2 97%   BMI 31.01 kg/m    BMI= Body mass index is 31.01 kg/m .  Wt Readings from Last 3 Encounters:   05/17/23 81.4 kg (179 lb 6.4 oz)   03/29/23 77.8 kg (171 lb 8 oz)   03/23/23 83.9 kg (185 lb)           ENT/Mouth: membranes moist, no oral lesions or bleeding gums.      EYES:  no scleral icterus, normal conjunctivae       Chest/Lungs:   lungs are clear to auscultation, no rales or wheezing,  equal chest wall expansion    Cardiovascular:   Regular. Normal first and second heart sounds with no murmurs, rubs, or gallops; the carotid, radial and posterior tibial pulses are intact,  no edema bilaterally        Extremities: no cyanosis or clubbing   Skin: no xanthelasma, warm.    Neurologic: no tremors     Psychiatric: alert and oriented x3, calm        Please refer above for cardiac ROS details.        Medical History  Surgical History Family History Social History   No past medical history on file.  Past Surgical History:   Procedure Laterality Date     IR PULMONARY ANGIOGRAM BILATERAL  3/27/2023     No family history on file.     Social History     Socioeconomic History     Marital status: Single     Spouse name: Not on file     Number of children: Not on file     Years of education: Not on file     Highest education level: Not on file   Occupational History     Not on file   Tobacco Use     Smoking status: Former     Types: Cigarettes     Smokeless tobacco: Never   Substance and Sexual Activity     Alcohol use: Not on file     Drug use: Not on file     Sexual activity: Not on file   Other Topics Concern     Not on file   Social History Narrative     Not on file     Social Determinants of Health     Financial Resource Strain: Low Risk  (9/25/2023)    [facility]    Financial Resource Strain      Difficulty of Paying Living Expenses: 3      Difficulty of Paying Living Expenses:  "Not on file   Food Insecurity: No Food Insecurity (9/25/2023)    Received from [facility]    Food Insecurity      Worried About Running Out of Food in the Last Year: 1   Transportation Needs: No Transportation Needs (9/25/2023)    Received from [facility]    Transportation Needs      Lack of Transportation (Medical): 1   Physical Activity: Not on file   Stress: Not on file   Social Connections: Socially Integrated (9/25/2023)    Received from [facility]    Social Connections      Frequency of Communication with Friends and Family: 0   Interpersonal Safety: Not on file   Housing Stability: Low Risk  (9/25/2023)    Received from [facility]    Housing Stability      Unable to Pay for Housing in the Last Year: 1           Medications  Allergies   Current Outpatient Medications   Medication Sig Dispense Refill     apixaban ANTICOAGULANT (ELIQUIS ANTICOAGULANT) 5 MG tablet Take 5 mg by mouth       esomeprazole (NEXIUM) 20 MG DR capsule Take 20 mg by mouth every morning (before breakfast) Take 30-60 minutes before eating.       losartan (COZAAR) 25 MG tablet Take 1 tablet (25 mg) by mouth daily 90 tablet 0     SUMAtriptan (IMITREX) 20 MG/ACT nasal spray Spray 1 spray in nostril every 2 hours as needed USE 1 SPRAY(S) EVERY 2 HOURS AS NEEDED. MAX 40MG/24 HOURS       topiramate (TOPAMAX) 25 MG tablet Take 1 tablet by mouth At Bedtime       No Known Allergies       Lab Results    Chemistry/lipid CBC Cardiac Enzymes/BNP/TSH/INR   No results for input(s): \"CHOL\", \"HDL\", \"LDL\", \"TRIG\", \"CHOLHDLRATIO\" in the last 50181 hours.  No results for input(s): \"LDL\" in the last 47800 hours.  Recent Labs   Lab Test 03/31/23  0639   *   POTASSIUM 4.3   CHLORIDE 103   CO2 22   GLC 93   BUN 9.9   CR 0.82   GFRESTIMATED 81   NANI 9.2     Recent Labs   Lab Test 03/31/23  0639 03/30/23  0721 03/29/23  0540 " "  CR 0.82 0.82 0.79     No results for input(s): \"A1C\" in the last 85417 hours.       Recent Labs   Lab Test 03/31/23  0639   WBC 5.3   HGB 12.0   HCT 36.7   MCV 87        Recent Labs   Lab Test 03/31/23  0639 03/30/23  0721 03/29/23  0540   HGB 12.0 12.3 11.6*    No results for input(s): \"TROPONINI\" in the last 07173 hours.  Recent Labs   Lab Test 03/27/23  1708   NTBNPI 7,484*     No results for input(s): \"TSH\" in the last 42779 hours.  Recent Labs   Lab Test 03/27/23  1708   INR 1.42*          Maribel Ugarte PA-C                                         Thank you for allowing me to participate in the care of your patient.      Sincerely,     Maribel Ugarte PA-C     [facility]  cc:   Moris Swanson MD  [address]  [address]  [place] [address]      "

## 2024-08-26 NOTE — PATIENT INSTRUCTIONS
Michell Orozco,    It was a pleasure to see you today at the [facility].     My recommendations after this visit include:    - Continue current medications- start rosuvastatin 5 mg daily for cholesterol.  - Fasting lipids in 3 months  - Schedule nuclear stress test  - Zio patch to be mailed to your house  - If SOB gets severe go to ER  - Follow up with Dr. Swanson in 6 months    - Please call [phone], if you have any questions or concerns      Maribel Ugarte PA-C

## 2024-09-09 ENCOUNTER — HOSPITAL ENCOUNTER (OUTPATIENT)
Dept: NUCLEAR MEDICINE | Facility: HOSPITAL | Age: 64
Discharge: HOME | End: 2024-09-09
Attending: STUDENT IN AN ORGANIZED HEALTH CARE EDUCATION/TRAINING PROGRAM
Payer: COMMERCIAL

## 2024-09-09 ENCOUNTER — HOSPITAL ENCOUNTER (OUTPATIENT)
Dept: CARDIOLOGY | Facility: HOSPITAL | Age: 64
Discharge: HOME | End: 2024-09-09
Attending: STUDENT IN AN ORGANIZED HEALTH CARE EDUCATION/TRAINING PROGRAM
Payer: COMMERCIAL

## 2024-09-09 DIAGNOSIS — I42.9 CARDIOMYOPATHY, UNSPECIFIED TYPE (H): ICD-10-CM

## 2024-09-09 DIAGNOSIS — R06.09 DYSPNEA ON EXERTION: ICD-10-CM

## 2024-09-09 LAB
CV STRESS CURRENT BP HE: NORMAL
CV STRESS CURRENT HR HE: 107
CV STRESS CURRENT HR HE: 111
CV STRESS CURRENT HR HE: 69
CV STRESS CURRENT HR HE: 73
CV STRESS CURRENT HR HE: 75
CV STRESS CURRENT HR HE: 75
CV STRESS CURRENT HR HE: 76
CV STRESS CURRENT HR HE: 76
CV STRESS CURRENT HR HE: 77
CV STRESS CURRENT HR HE: 78
CV STRESS CURRENT HR HE: 80
CV STRESS CURRENT HR HE: 83
CV STRESS CURRENT HR HE: 95
CV STRESS CURRENT HR HE: 95
CV STRESS DEVIATION TIME HE: NORMAL
CV STRESS ECHO PERCENT HR HE: NORMAL
CV STRESS EXERCISE STAGE HE: NORMAL
CV STRESS FINAL RESTING BP HE: NORMAL
CV STRESS FINAL RESTING HR HE: 76
CV STRESS MAX HR HE: 111
CV STRESS MAX TREADMILL GRADE HE: 0
CV STRESS MAX TREADMILL SPEED HE: 0
CV STRESS PEAK DIA BP HE: NORMAL
CV STRESS PEAK SYS BP HE: NORMAL
CV STRESS PHASE HE: NORMAL
CV STRESS PROTOCOL HE: NORMAL
CV STRESS RESTING PT POSITION HE: NORMAL
CV STRESS ST DEVIATION AMOUNT HE: NORMAL
CV STRESS ST DEVIATION ELEVATION HE: NORMAL
CV STRESS ST EVELATION AMOUNT HE: NORMAL
CV STRESS TEST TYPE HE: NORMAL
CV STRESS TOTAL STAGE TIME MIN 1 HE: NORMAL
NUC STRESS EJECTION FRACTION: 71 %
RATE PRESSURE PRODUCT: NORMAL
STRESS ECHO BASELINE DIASTOLIC HE: 82
STRESS ECHO BASELINE HR: 80
STRESS ECHO BASELINE SYSTOLIC BP: 180
STRESS ECHO CALCULATED PERCENT HR: 71 %
STRESS ECHO LAST STRESS DIASTOLIC BP: 82
STRESS ECHO LAST STRESS HR: 80
STRESS ECHO LAST STRESS SYSTOLIC BP: 174
STRESS ECHO TARGET HR: 157

## 2024-09-09 PROCEDURE — 93017 CV STRESS TEST TRACING ONLY: CPT

## 2024-09-09 PROCEDURE — 93016 CV STRESS TEST SUPVJ ONLY: CPT | Performed by: GENERAL ACUTE CARE HOSPITAL

## 2024-09-09 PROCEDURE — 250N000011 HC RX IP 250 OP 636: Performed by: STUDENT IN AN ORGANIZED HEALTH CARE EDUCATION/TRAINING PROGRAM

## 2024-09-09 PROCEDURE — 78452 HT MUSCLE IMAGE SPECT MULT: CPT

## 2024-09-09 PROCEDURE — A9500 TC99M SESTAMIBI: HCPCS | Performed by: STUDENT IN AN ORGANIZED HEALTH CARE EDUCATION/TRAINING PROGRAM

## 2024-09-09 PROCEDURE — 343N000001 HC RX 343: Performed by: STUDENT IN AN ORGANIZED HEALTH CARE EDUCATION/TRAINING PROGRAM

## 2024-09-09 PROCEDURE — 93018 CV STRESS TEST I&R ONLY: CPT | Performed by: INTERNAL MEDICINE

## 2024-09-09 PROCEDURE — 78452 HT MUSCLE IMAGE SPECT MULT: CPT | Mod: 26 | Performed by: INTERNAL MEDICINE

## 2024-09-09 RX ORDER — REGADENOSON 0.08 MG/ML
0.4 INJECTION, SOLUTION INTRAVENOUS ONCE
Status: COMPLETED | OUTPATIENT
Start: 2024-09-09 | End: 2024-09-09

## 2024-09-09 RX ORDER — AMINOPHYLLINE 25 MG/ML
50-100 INJECTION, SOLUTION INTRAVENOUS
Status: DISCONTINUED | OUTPATIENT
Start: 2024-09-09 | End: 2024-09-10 | Stop reason: HOSPADM

## 2024-09-09 RX ADMIN — REGADENOSON 0.4 MG: 0.08 INJECTION, SOLUTION INTRAVENOUS at 13:32

## 2024-09-09 RX ADMIN — Medication 8.6 MILLICURIE: at 12:50

## 2024-09-09 RX ADMIN — Medication 30.1 MILLICURIE: at 14:36

## 2024-09-13 PROCEDURE — 93244 EXT ECG>48HR<7D REV&INTERPJ: CPT | Performed by: INTERNAL MEDICINE

## 2024-09-16 DIAGNOSIS — R00.2 PALPITATIONS: Primary | ICD-10-CM

## 2024-09-16 RX ORDER — METOPROLOL SUCCINATE 25 MG/1
25 TABLET, EXTENDED RELEASE ORAL DAILY
Qty: 30 TABLET | Refills: 5 | Status: SHIPPED | OUTPATIENT
Start: 2024-09-16

## 2024-09-16 NOTE — RESULT ENCOUNTER NOTE
Please let Michell know that her monitor showed 96 episodes of nonsustained SVT in 4 days which would be about 24 episodes a day.  Given she is fast heart rates with this it could be contributing to her shortness of breath.  We could trial metoprolol 25 mg once a day and have patient monitor for any lightheadedness and see if it changes her shortness of breath.  Recommend follow-up in 3 months with me or Dr. Swanson.

## 2024-11-05 DIAGNOSIS — I42.9 CARDIOMYOPATHY, UNSPECIFIED TYPE (H): ICD-10-CM

## 2024-11-06 RX ORDER — LOSARTAN POTASSIUM 25 MG/1
25 TABLET ORAL DAILY
Qty: 90 TABLET | Refills: 2 | Status: SHIPPED | OUTPATIENT
Start: 2024-11-06

## 2025-03-10 DIAGNOSIS — R00.2 PALPITATIONS: ICD-10-CM

## 2025-03-10 RX ORDER — METOPROLOL SUCCINATE 25 MG/1
25 TABLET, EXTENDED RELEASE ORAL DAILY
Qty: 60 TABLET | Refills: 0 | Status: SHIPPED | OUTPATIENT
Start: 2025-03-10

## 2025-05-22 ENCOUNTER — TELEPHONE (OUTPATIENT)
Dept: CARDIOLOGY | Facility: CLINIC | Age: 65
End: 2025-05-22
Payer: COMMERCIAL

## 2025-05-22 DIAGNOSIS — R00.2 PALPITATIONS: ICD-10-CM

## 2025-05-22 RX ORDER — METOPROLOL SUCCINATE 25 MG/1
25 TABLET, EXTENDED RELEASE ORAL DAILY
Qty: 90 TABLET | Refills: 1 | Status: SHIPPED | OUTPATIENT
Start: 2025-05-22

## 2025-05-22 NOTE — TELEPHONE ENCOUNTER
Call received from Michell. She was simply calling in for a refill of Metoprolol and to schedule her overdue 6 month follow up with tony. This was refilled for her and she was transferred to schedulers to please arrange this. -[facility]

## 2025-06-18 ENCOUNTER — OFFICE VISIT (OUTPATIENT)
Dept: CARDIOLOGY | Facility: CLINIC | Age: 65
End: 2025-06-18
Payer: COMMERCIAL

## 2025-06-18 VITALS
BODY MASS INDEX: 29.6 KG/M2 | WEIGHT: 195.3 LBS | HEIGHT: 68 IN | DIASTOLIC BLOOD PRESSURE: 80 MMHG | HEART RATE: 58 BPM | OXYGEN SATURATION: 97 % | SYSTOLIC BLOOD PRESSURE: 152 MMHG

## 2025-06-18 DIAGNOSIS — E78.5 HYPERLIPIDEMIA LDL GOAL <100: ICD-10-CM

## 2025-06-18 DIAGNOSIS — R06.09 DYSPNEA ON EXERTION: ICD-10-CM

## 2025-06-18 DIAGNOSIS — R00.2 PALPITATIONS: ICD-10-CM

## 2025-06-18 DIAGNOSIS — I42.9 CARDIOMYOPATHY, UNSPECIFIED TYPE (H): ICD-10-CM

## 2025-06-18 PROCEDURE — 3077F SYST BP >= 140 MM HG: CPT | Performed by: INTERNAL MEDICINE

## 2025-06-18 PROCEDURE — 99214 OFFICE O/P EST MOD 30 MIN: CPT | Performed by: INTERNAL MEDICINE

## 2025-06-18 PROCEDURE — G2211 COMPLEX E/M VISIT ADD ON: HCPCS | Performed by: INTERNAL MEDICINE

## 2025-06-18 PROCEDURE — 3079F DIAST BP 80-89 MM HG: CPT | Performed by: INTERNAL MEDICINE

## 2025-06-18 RX ORDER — METOPROLOL SUCCINATE 25 MG/1
25 TABLET, EXTENDED RELEASE ORAL DAILY
Qty: 90 TABLET | Refills: 3 | Status: SHIPPED | OUTPATIENT
Start: 2025-06-18

## 2025-06-18 RX ORDER — ROSUVASTATIN CALCIUM 5 MG/1
5 TABLET, COATED ORAL DAILY
Qty: 90 TABLET | Refills: 3 | Status: SHIPPED | OUTPATIENT
Start: 2025-06-18

## 2025-06-18 RX ORDER — LOSARTAN POTASSIUM 25 MG/1
25 TABLET ORAL DAILY
Qty: 90 TABLET | Refills: 2 | Status: SHIPPED | OUTPATIENT
Start: 2025-06-18

## 2025-06-18 NOTE — PROGRESS NOTES
"    Cardiology Progress Note     Assessment:  Large/saddle pulmonary emboli status post IR mechanical thrombectomy in March 2023, chronic Eliquis, no recurrence  Paroxysmal atrial flutter, resolved, likely sequelae of acutely increased right atrial pressure, no symptomatic recurrence  Short runs of SVT detected by Zio patch, asymptomatic  Borderline to mildly depressed LV systolic function likely due to interventricular septal motion abnormalities related to acute PE, no fluid overload, resolved  DVT right lower extremity appears to be provoked, resolved  Mild coronary calcification by CT consistent with coronary atherosclerosis, negative stress test 2014  Hypercholesterolemia on statin, adequate control  Elevated blood pressure without history of hypertension    Plan:  Continue current cardiac medications  Encouraged to become more physically active    Follow-up in 1 year  The longitudinal plan of care for the diagnosis(es)/condition(s) as documented were addressed during this visit. Due to the added complexity in care, I will continue to support Michell in the subsequent management and with ongoing continuity of care.   Subjective:   This is 65 year old female who comes in today for follow-up visit.  She has done well since she was last seen in our cardiology clinic.  She has not had chest pain or shortness of breath.  She is not very physically active but whenever she walks she does not have any cardiac symptoms.  Her weight has been stable.  She has no heart palpitation or syncope.    Review of Systems:   Negative other than history of present illness    Objective:   BP (!) 152/80 (BP Location: Left arm, Patient Position: Sitting, Cuff Size: Adult Regular)   Pulse 58   Ht 1.727 m (5' 8\")   Wt 88.6 kg (195 lb 4.8 oz)   SpO2 97%   BMI 29.70 kg/m    Physical Exam:  GENERAL: no distress  NECK: No JVD  LUNGS: Clear to auscultation.  CARDIAC: regular rhythm, S1 & S2 normal.  No heaves, thrills, gallops or " murmurs.  ABDOMEN: flat, negative hepatosplenomegaly, soft and non-tender.  EXTREMITIES: No evidence of cyanosis, clubbing or edema.    Current Outpatient Medications   Medication Sig Dispense Refill    apixaban ANTICOAGULANT (ELIQUIS ANTICOAGULANT) 5 MG tablet Take 5 mg by mouth      esomeprazole (NEXIUM) 20 MG DR capsule Take 20 mg by mouth every morning (before breakfast) Take 30-60 minutes before eating.      losartan (COZAAR) 25 MG tablet Take 1 tablet (25 mg) by mouth daily. 90 tablet 2    metoprolol succinate ER (TOPROL XL) 25 MG 24 hr tablet Take 1 tablet (25 mg) by mouth daily. 90 tablet 3    rosuvastatin (CRESTOR) 5 MG tablet Take 1 tablet (5 mg) by mouth daily. 90 tablet 3    SUMAtriptan (IMITREX) 20 MG/ACT nasal spray Spray 1 spray in nostril every 2 hours as needed USE 1 SPRAY(S) EVERY 2 HOURS AS NEEDED. MAX 40MG/24 HOURS      topiramate (TOPAMAX) 25 MG tablet Take 1 tablet by mouth At Bedtime         Cardiographics:    Aarono patch: September 2024  Predominant underlying rhythm was sinus rhythm, 51 to 159bpm, average 73bpm.  No sustained tachyarrhythmias.  No atrial fibrillation.  There were no pauses of greater than 3 seconds.  Rare supraventricular ectopic beats (3%). 96 non sustained SVT runs occurred, the run with the fastest interval lasting 9 beats with a max rate of 240 bpm, the longest lasting 13 beats with an avg rate of 134 bpm.   Rare premature ventricular contractions (3%).      Stress test: September 2024    1.Negative pharmacological regadenoson ECG for ischemia.    2.The nuclear stress test is negative for inducible myocardial ischemia or infarction.    3.The left ventricular ejection fraction at stress is 71%.      ECHO: March 2023  1. The left ventricle is normal in size. Left ventricular function is  decreased. The ejection fraction is 45-50% (mildly reduced). There is mild  concentric left ventricular hypertrophy.  2. Flattened septum is consistent with RV pressure overload.  3. The  "right ventricle is mildly dilated. Moderately decreased right  ventricular systolic function  4. There is mild (1+) mitral regurgitation.  5. The right ventricular systolic pressure is approximated at 34mmHg plus the  right atrial pressure. Inferior vena cava not well visualized for estimation  of right atrial pressure    CT chest: 2023  Large burden acute bilateral pulmonary emboli with marked right heart strain, but no evidence of pulmonary infarction   Mild coronary calcification     Lab Results    Chemistry/lipid CBC Cardiac Enzymes/BNP/TSH/INR   No results for input(s): \"CHOL\", \"HDL\", \"LDL\", \"TRIG\", \"CHOLHDLRATIO\" in the last 23637 hours.  No results for input(s): \"LDL\" in the last 28898 hours.  Recent Labs   Lab Test 03/31/23  0639   *   POTASSIUM 4.3   CHLORIDE 103   CO2 22   GLC 93   BUN 9.9   CR 0.82   GFRESTIMATED 81   NANI 9.2     Recent Labs   Lab Test 03/31/23  0639 03/30/23  0721 03/29/23  0540   CR 0.82 0.82 0.79     No results for input(s): \"A1C\" in the last 86395 hours.       Recent Labs   Lab Test 03/31/23  0639   WBC 5.3   HGB 12.0   HCT 36.7   MCV 87        Recent Labs   Lab Test 03/31/23  0639 03/30/23  0721 03/29/23  0540   HGB 12.0 12.3 11.6*    No results for input(s): \"TROPONINI\" in the last 55363 hours.  Recent Labs   Lab Test 03/27/23  1708   NTBNPI 7,484*     No results for input(s): \"TSH\" in the last 13353 hours.  Recent Labs   Lab Test 03/27/23  1708   INR 1.42*                     "

## 2025-06-18 NOTE — LETTER
"6/18/2025    Gabriele Garner MD  [address]  [place] [address]    RE: Michell Orozco       Dear Colleague,     I had the pleasure of seeing Michell Orozco in the [facility].      Cardiology Progress Note     Assessment:  Large/saddle pulmonary emboli status post IR mechanical thrombectomy in March 2023, chronic Eliquis, no recurrence  Paroxysmal atrial flutter, resolved, likely sequelae of acutely increased right atrial pressure, no symptomatic recurrence  Short runs of SVT detected by Zio patch, asymptomatic  Borderline to mildly depressed LV systolic function likely due to interventricular septal motion abnormalities related to acute PE, no fluid overload, resolved  DVT right lower extremity appears to be provoked, resolved  Mild coronary calcification by CT consistent with coronary atherosclerosis, negative stress test 2014  Hypercholesterolemia on statin, adequate control  Elevated blood pressure without history of hypertension    Plan:  Continue current cardiac medications  Encouraged to become more physically active    Follow-up in 1 year  The longitudinal plan of care for the diagnosis(es)/condition(s) as documented were addressed during this visit. Due to the added complexity in care, I will continue to support Michell in the subsequent management and with ongoing continuity of care.   Subjective:   This is 65 year old female who comes in today for follow-up visit.  She has done well since she was last seen in our cardiology clinic.  She has not had chest pain or shortness of breath.  She is not very physically active but whenever she walks she does not have any cardiac symptoms.  Her weight has been stable.  She has no heart palpitation or syncope.    Review of Systems:   Negative other than history of present illness    Objective:   BP (!) 152/80 (BP Location: Left arm, Patient Position: Sitting, Cuff Size: Adult Regular)   Pulse 58   Ht 1.727 m (5' 8\")   Wt 88.6 kg (195 " lb 4.8 oz)   SpO2 97%   BMI 29.70 kg/m    Physical Exam:  GENERAL: no distress  NECK: No JVD  LUNGS: Clear to auscultation.  CARDIAC: regular rhythm, S1 & S2 normal.  No heaves, thrills, gallops or murmurs.  ABDOMEN: flat, negative hepatosplenomegaly, soft and non-tender.  EXTREMITIES: No evidence of cyanosis, clubbing or edema.    Current Outpatient Medications   Medication Sig Dispense Refill     apixaban ANTICOAGULANT (ELIQUIS ANTICOAGULANT) 5 MG tablet Take 5 mg by mouth       esomeprazole (NEXIUM) 20 MG DR capsule Take 20 mg by mouth every morning (before breakfast) Take 30-60 minutes before eating.       losartan (COZAAR) 25 MG tablet Take 1 tablet (25 mg) by mouth daily. 90 tablet 2     metoprolol succinate ER (TOPROL XL) 25 MG 24 hr tablet Take 1 tablet (25 mg) by mouth daily. 90 tablet 3     rosuvastatin (CRESTOR) 5 MG tablet Take 1 tablet (5 mg) by mouth daily. 90 tablet 3     SUMAtriptan (IMITREX) 20 MG/ACT nasal spray Spray 1 spray in nostril every 2 hours as needed USE 1 SPRAY(S) EVERY 2 HOURS AS NEEDED. MAX 40MG/24 HOURS       topiramate (TOPAMAX) 25 MG tablet Take 1 tablet by mouth At Bedtime         Cardiographics:    Aarono patch: September 2024  Predominant underlying rhythm was sinus rhythm, 51 to 159bpm, average 73bpm.  No sustained tachyarrhythmias.  No atrial fibrillation.  There were no pauses of greater than 3 seconds.  Rare supraventricular ectopic beats (3%). 96 non sustained SVT runs occurred, the run with the fastest interval lasting 9 beats with a max rate of 240 bpm, the longest lasting 13 beats with an avg rate of 134 bpm.   Rare premature ventricular contractions (3%).      Stress test: September 2024     1.Negative pharmacological regadenoson ECG for ischemia.     2.The nuclear stress test is negative for inducible myocardial ischemia or infarction.     3.The left ventricular ejection fraction at stress is 71%.      ECHO: March 2023  1. The left ventricle is normal in size. Left  "ventricular function is  decreased. The ejection fraction is 45-50% (mildly reduced). There is mild  concentric left ventricular hypertrophy.  2. Flattened septum is consistent with RV pressure overload.  3. The right ventricle is mildly dilated. Moderately decreased right  ventricular systolic function  4. There is mild (1+) mitral regurgitation.  5. The right ventricular systolic pressure is approximated at 34mmHg plus the  right atrial pressure. Inferior vena cava not well visualized for estimation  of right atrial pressure    CT chest: 2023  Large burden acute bilateral pulmonary emboli with marked right heart strain, but no evidence of pulmonary infarction   Mild coronary calcification     Lab Results    Chemistry/lipid CBC Cardiac Enzymes/BNP/TSH/INR   No results for input(s): \"CHOL\", \"HDL\", \"LDL\", \"TRIG\", \"CHOLHDLRATIO\" in the last 97077 hours.  No results for input(s): \"LDL\" in the last 60530 hours.  Recent Labs   Lab Test 03/31/23  0639   *   POTASSIUM 4.3   CHLORIDE 103   CO2 22   GLC 93   BUN 9.9   CR 0.82   GFRESTIMATED 81   NANI 9.2     Recent Labs   Lab Test 03/31/23  0639 03/30/23  0721 03/29/23  0540   CR 0.82 0.82 0.79     No results for input(s): \"A1C\" in the last 66411 hours.       Recent Labs   Lab Test 03/31/23  0639   WBC 5.3   HGB 12.0   HCT 36.7   MCV 87        Recent Labs   Lab Test 03/31/23  0639 03/30/23  0721 03/29/23  0540   HGB 12.0 12.3 11.6*    No results for input(s): \"TROPONINI\" in the last 12655 hours.  Recent Labs   Lab Test 03/27/23  1708   NTBNPI 7,484*     No results for input(s): \"TSH\" in the last 40080 hours.  Recent Labs   Lab Test 03/27/23  1708   INR 1.42*                       Thank you for allowing me to participate in the care of your patient.      Sincerely,     Arthur Swanson MD     [facility]  cc:   Moris Swanson MD  [address]  [address]  [place] [address]      "

## (undated) RX ORDER — LIDOCAINE HYDROCHLORIDE 10 MG/ML
INJECTION, SOLUTION INFILTRATION; PERINEURAL
Status: DISPENSED
Start: 2023-03-27

## (undated) RX ORDER — FENTANYL CITRATE 50 UG/ML
INJECTION, SOLUTION INTRAMUSCULAR; INTRAVENOUS
Status: DISPENSED
Start: 2023-03-27